# Patient Record
Sex: FEMALE | Race: WHITE | NOT HISPANIC OR LATINO | Employment: OTHER | URBAN - METROPOLITAN AREA
[De-identification: names, ages, dates, MRNs, and addresses within clinical notes are randomized per-mention and may not be internally consistent; named-entity substitution may affect disease eponyms.]

---

## 2022-03-04 ENCOUNTER — TELEPHONE (OUTPATIENT)
Dept: NEUROLOGY | Facility: CLINIC | Age: 67
End: 2022-03-04

## 2022-03-04 ENCOUNTER — OFFICE VISIT (OUTPATIENT)
Dept: NEUROLOGY | Facility: CLINIC | Age: 67
End: 2022-03-04

## 2022-03-04 VITALS — OXYGEN SATURATION: 95 % | HEIGHT: 67 IN | BODY MASS INDEX: 34.21 KG/M2 | WEIGHT: 218 LBS

## 2022-03-04 DIAGNOSIS — R42 DIZZINESS: ICD-10-CM

## 2022-03-04 DIAGNOSIS — R41.3 MEMORY LOSS: Primary | ICD-10-CM

## 2022-03-04 PROCEDURE — 99205 OFFICE O/P NEW HI 60 MIN: CPT | Performed by: PSYCHIATRY & NEUROLOGY

## 2022-03-04 RX ORDER — IRBESARTAN 150 MG/1
TABLET ORAL
Qty: 30 TABLET | Refills: 5 | Status: SHIPPED | OUTPATIENT
Start: 2022-03-04 | End: 2022-05-12 | Stop reason: ALTCHOICE

## 2022-03-04 RX ORDER — DESVENLAFAXINE 100 MG/1
TABLET, EXTENDED RELEASE ORAL
COMMUNITY
Start: 2021-12-29 | End: 2022-03-04 | Stop reason: SDUPTHER

## 2022-03-04 RX ORDER — IRBESARTAN 150 MG/1
TABLET ORAL
COMMUNITY
Start: 2021-12-29 | End: 2022-03-04 | Stop reason: SDUPTHER

## 2022-03-04 RX ORDER — LEVOCETIRIZINE DIHYDROCHLORIDE 5 MG/1
TABLET, FILM COATED ORAL
COMMUNITY
Start: 2021-12-10 | End: 2022-08-19

## 2022-03-04 RX ORDER — SIMVASTATIN 40 MG
40 TABLET ORAL NIGHTLY
Qty: 30 TABLET | Refills: 5 | Status: SHIPPED | OUTPATIENT
Start: 2022-03-04 | End: 2022-04-26 | Stop reason: HOSPADM

## 2022-03-04 RX ORDER — MECLIZINE HYDROCHLORIDE 25 MG/1
TABLET ORAL
COMMUNITY
Start: 2021-12-10 | End: 2022-08-19

## 2022-03-04 RX ORDER — LEVOTHYROXINE SODIUM 0.15 MG/1
TABLET ORAL
COMMUNITY
Start: 2021-12-29 | End: 2022-03-04

## 2022-03-04 RX ORDER — SIMVASTATIN 40 MG
TABLET ORAL
COMMUNITY
Start: 2021-12-09 | End: 2022-03-04 | Stop reason: SDUPTHER

## 2022-03-04 RX ORDER — DESVENLAFAXINE 100 MG/1
100 TABLET, EXTENDED RELEASE ORAL DAILY
Qty: 30 TABLET | Refills: 6 | Status: SHIPPED | OUTPATIENT
Start: 2022-03-04

## 2022-03-04 RX ORDER — LEVOTHYROXINE SODIUM 0.05 MG/1
TABLET ORAL
Qty: 60 TABLET | Refills: 2 | Status: SHIPPED | OUTPATIENT
Start: 2022-03-04 | End: 2022-05-12 | Stop reason: ALTCHOICE

## 2022-03-04 NOTE — PROGRESS NOTES
Subjective:    CC: Batsheva Barahona is seen today in consultation at the request of Jerrod Haywood MD for Dizziness, Memory Loss, and Numbness       HPI:  66-year-old female accompanied by her son with a history of postsurgical hypothyroidism, hyperlipidemia, hypertension, aortic valve replacement, depression presents with memory problems, dizziness.  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  After senior living she started to have worsening depression because of not having to do much .  Then about 6 to 8 months ago she stopped taking all of her medications as she did not think anything was helping including her thyroid, cholesterol, antidepressant and blood pressure meds.  Since then she has been extremely forgetful especially with her short-term memory.  She also gets agitated/irritable and sleeps a lot.  As per son she also has word finding difficulties and gets confused easily for example while ordering food from me drive-through patient takes a lot of time in deciding what she will eat.  She can still carry out her ADLs including stopped paying the bills now (son has taken over).  She has also stopped driving.  She had recently including a TSH level that was 47.8!!  B12 was 302, lipid panel was as follows-, , .  She is also complaining of numbness in her fingertips as well as lightheadedness/dizziness on walking.  In May 2019 she felt dizzy and then passed out in the bathroom.  She was taken to [facility] at the time and told that she had a vasovagal episode.  Denies any recent episodes with loss of consciousness.    The following portions of the patient's history were reviewed today and updated as of 03/04/2022  : allergies, current medications, past family history, past medical history, past social history, past surgical history and problem list  These document will be scanned to patient's chart.      Current  "Outpatient Medications:   •  desvenlafaxine (PRISTIQ) 100 MG 24 hr tablet, , Disp: , Rfl:   •  dexamethasone (DECADRON) 0.75 MG tablet, , Disp: , Rfl:   •  irbesartan (AVAPRO) 150 MG tablet, , Disp: , Rfl:   •  levocetirizine (XYZAL) 5 MG tablet, , Disp: , Rfl:   •  meclizine (ANTIVERT) 25 MG tablet, , Disp: , Rfl:   •  simvastatin (ZOCOR) 40 MG tablet, , Disp: , Rfl:   •  levothyroxine (Synthroid) 50 MCG tablet, Take 1 tablet daily for 2 weeks then 1-1/2 tablets daily for 2 weeks then 2 tablets daily, Disp: 60 tablet, Rfl: 2   Past Medical History:   Diagnosis Date   • Anxiety    • Bowel trouble    • CAD (coronary artery disease)    • CTS (carpal tunnel syndrome)    • Depression    • Difficulty walking    • Dizziness    • History of heart valve replacement    • HL (hearing loss)    • Hypertension    • Memory loss    • Migraine    • Numbness and tingling    • Thyroid disease    • Weakness       Past Surgical History:   Procedure Laterality Date   • AORTIC VALVE REPAIR/REPLACEMENT     • BONE SPUR ARM      Elbow   •  SECTION        Family History   Problem Relation Age of Onset   • Aneurysm Mother    • Cancer Mother    • Stroke Mother    • Migraines Sister    • Migraines Brother    • Cancer Maternal Grandmother    • Dementia Maternal Grandmother       Social History     Socioeconomic History   • Marital status:    Tobacco Use   • Smoking status: Former Smoker     Types: Cigarettes   • Smokeless tobacco: Never Used   • Tobacco comment: quit 2013   Vaping Use   • Vaping Use: Never used   Substance and Sexual Activity   • Alcohol use: Never   • Drug use: Never   • Sexual activity: Defer     Review of Systems   Neurological: Positive for dizziness, memory problem and confusion.   All other systems reviewed and are negative.      Objective:    Ht 168.9 cm (66.5\")   Wt 98.9 kg (218 lb)   SpO2 95%   BMI 34.66 kg/m²     Neurology Exam:    General apperance: NAD.  Orthostatics checked today were positive.  " Blood pressure on lying/sitting down was 190/110.  Blood pressure on standing up was 178/98    Mental status: Alert, awake and oriented to  place and person.  Could not tell me the month, year or her date of birth    Recent and Remote memory: Impaired.  MMSE of 17/20 with a delayed recall of 0/3    Attention span and Concentration: Normal.     Language and Speech: Intact- No dysarthria.    Fluency, Naming , Repitition and Comprehension:  Intact    Cranial Nerves:   CN II: Visual fields are full. Intact. Fundi - Normal, No papillederma, Pupils - JUDE  CN III, IV and VI: Extraocular movements are intact. Normal saccades.   CN V: Facial sensation is intact.   CN VII: Muscles of facial expression reveal no asymmetry. Intact.   CN VIII: Hearing is intact. Whispered voice intact.   CN IX and X: Palate elevates symmetrically. Intact  CN XI: Shoulder shrug is intact.   CN XII: Tongue is midline without evidence of atrophy or fasciculation.     Ophthalmoscopic exam of optic disc-normal    Motor:  Right UE muscle strength 5/5. Normal tone.     Left UE muscle strength 5/5. Normal tone.      Right LE muscle strength5/5. Normal tone.     Left LE muscle strength 5/5. Normal tone.      Sensory: Normal light touch, vibration and pinprick sensation bilaterally.    DTRs: 2+ bilaterally in upper and lower extremities.    Babinski: Negative bilaterally.    Co-ordination: Normal finger-to-nose, heel to shin B/L.    Rhomberg: Negative.    Gait: Antalgic gait due to right hip pain.  Using a cane    Cardiovascular: Regular rate and rhythm without murmur, gallop or rub.    Assessment and Plan:  1. Memory loss  I feel patient's cognitive problems are because of underlying depression as well as severe hypothyroidism (TSH of 47.8)  I will get her MRI brain to make sure she has not had any ischemic events as her blood pressure and cholesterol have both been extremely high  I have told her to gradually start her Synthroid till she reaches a  dose of 100 mcg daily.  At her next visit I will repeat a thyroid function test.  Previously she was on higher doses however she had dizziness  -I have also told her to start taking vitamin B12 supplements  -I will also restart her back on her simvastatin 40 mg daily, Pristiq 100 mg daily and irbesartan that she will increase gradually to 150 mg daily  -Counseling given to partake in hobbies and to carry out mental activities such as reading.     - MRI Brain Without Contrast; Future    2. Dizziness  Is most likely due to fluctuations in blood pressure with positive orthostatics today  I will start her back on her irbesartan  I have also told her to keep herself extremely well-hydrated.    If she continues to have the dizziness then I may start her on low doses of fludrocortisone       Return in about 2 months (around 5/4/2022).     I spent over 55 minutes with the patient face to face out of which over 50% (45 minutes) was spent in management including obtaining MMSE, instructions and education.     Siomara Hyde MD

## 2022-03-04 NOTE — TELEPHONE ENCOUNTER
Yes she can.  There is no harm in taking it however we do not know the efficacy as it has not been studied

## 2022-04-18 RX ORDER — FLUDROCORTISONE ACETATE 0.1 MG/1
0.1 TABLET ORAL DAILY
Qty: 30 TABLET | Refills: 3 | Status: SHIPPED | OUTPATIENT
Start: 2022-04-18 | End: 2022-04-26 | Stop reason: HOSPADM

## 2022-04-22 ENCOUNTER — TELEPHONE (OUTPATIENT)
Dept: NEUROLOGY | Facility: CLINIC | Age: 67
End: 2022-04-22

## 2022-04-22 ENCOUNTER — HOSPITAL ENCOUNTER (OUTPATIENT)
Dept: MRI IMAGING | Facility: HOSPITAL | Age: 67
Discharge: HOME | End: 2022-04-22
Admitting: PSYCHIATRY & NEUROLOGY

## 2022-04-22 ENCOUNTER — APPOINTMENT (OUTPATIENT)
Dept: GENERAL RADIOLOGY | Facility: HOSPITAL | Age: 67
End: 2022-04-22

## 2022-04-22 ENCOUNTER — APPOINTMENT (OUTPATIENT)
Dept: CT IMAGING | Facility: HOSPITAL | Age: 67
End: 2022-04-22

## 2022-04-22 ENCOUNTER — HOSPITAL ENCOUNTER (INPATIENT)
Facility: HOSPITAL | Age: 67
LOS: 4 days | Discharge: NURSING, REHAB OR LONG-TERM CARE | End: 2022-04-26
Attending: EMERGENCY MEDICINE | Admitting: INTERNAL MEDICINE

## 2022-04-22 DIAGNOSIS — R47.1 DYSARTHRIA: ICD-10-CM

## 2022-04-22 DIAGNOSIS — R41.0 CONFUSION: ICD-10-CM

## 2022-04-22 DIAGNOSIS — R41.3 MEMORY LOSS: ICD-10-CM

## 2022-04-22 DIAGNOSIS — I63.9 ACUTE STROKE DUE TO ISCHEMIA: Primary | ICD-10-CM

## 2022-04-22 PROBLEM — G43.909 MIGRAINE HEADACHE: Status: ACTIVE | Noted: 2022-04-22

## 2022-04-22 PROBLEM — R42 DIZZINESS: Status: ACTIVE | Noted: 2022-04-22

## 2022-04-22 PROBLEM — E89.0 POST-SURGICAL HYPOTHYROIDISM: Status: ACTIVE | Noted: 2022-04-22

## 2022-04-22 PROBLEM — I10 PRIMARY HYPERTENSION: Status: ACTIVE | Noted: 2022-04-22

## 2022-04-22 PROBLEM — I25.10 CORONARY ARTERY DISEASE INVOLVING NATIVE CORONARY ARTERY: Status: ACTIVE | Noted: 2022-04-22

## 2022-04-22 PROBLEM — E78.2 MIXED HYPERLIPIDEMIA: Status: ACTIVE | Noted: 2022-04-22

## 2022-04-22 PROBLEM — R55 SYNCOPE: Status: ACTIVE | Noted: 2022-04-22

## 2022-04-22 LAB
ALBUMIN SERPL-MCNC: 4.4 G/DL (ref 3.5–5.2)
ALBUMIN/GLOB SERPL: 1.4 G/DL
ALP SERPL-CCNC: 107 U/L (ref 39–117)
ALT SERPL W P-5'-P-CCNC: 13 U/L (ref 1–33)
ANION GAP SERPL CALCULATED.3IONS-SCNC: 10 MMOL/L (ref 5–15)
AST SERPL-CCNC: 17 U/L (ref 1–32)
BASOPHILS # BLD AUTO: 0.03 10*3/MM3 (ref 0–0.2)
BASOPHILS NFR BLD AUTO: 0.3 % (ref 0–1.5)
BILIRUB SERPL-MCNC: 0.5 MG/DL (ref 0–1.2)
BILIRUB UR QL STRIP: NEGATIVE
BUN SERPL-MCNC: 18 MG/DL (ref 8–23)
BUN/CREAT SERPL: 19.4 (ref 7–25)
CALCIUM SPEC-SCNC: 9.8 MG/DL (ref 8.6–10.5)
CHLORIDE SERPL-SCNC: 103 MMOL/L (ref 98–107)
CLARITY UR: CLEAR
CO2 SERPL-SCNC: 28 MMOL/L (ref 22–29)
COLOR UR: YELLOW
CREAT SERPL-MCNC: 0.93 MG/DL (ref 0.57–1)
D-LACTATE SERPL-SCNC: 1.7 MMOL/L (ref 0.5–2)
DEPRECATED RDW RBC AUTO: 46.6 FL (ref 37–54)
EGFRCR SERPLBLD CKD-EPI 2021: 67.9 ML/MIN/1.73
EOSINOPHIL # BLD AUTO: 0.2 10*3/MM3 (ref 0–0.4)
EOSINOPHIL NFR BLD AUTO: 1.7 % (ref 0.3–6.2)
ERYTHROCYTE [DISTWIDTH] IN BLOOD BY AUTOMATED COUNT: 13 % (ref 12.3–15.4)
FLUAV RNA RESP QL NAA+PROBE: NOT DETECTED
FLUBV RNA RESP QL NAA+PROBE: NOT DETECTED
GLOBULIN UR ELPH-MCNC: 3.1 GM/DL
GLUCOSE SERPL-MCNC: 117 MG/DL (ref 65–99)
GLUCOSE UR STRIP-MCNC: NEGATIVE MG/DL
HCT VFR BLD AUTO: 41.8 % (ref 34–46.6)
HGB BLD-MCNC: 13.9 G/DL (ref 12–15.9)
HGB UR QL STRIP.AUTO: NEGATIVE
HOLD SPECIMEN: NORMAL
IMM GRANULOCYTES # BLD AUTO: 0.1 10*3/MM3 (ref 0–0.05)
IMM GRANULOCYTES NFR BLD AUTO: 0.9 % (ref 0–0.5)
KETONES UR QL STRIP: NEGATIVE
LEUKOCYTE ESTERASE UR QL STRIP.AUTO: NEGATIVE
LYMPHOCYTES # BLD AUTO: 2.46 10*3/MM3 (ref 0.7–3.1)
LYMPHOCYTES NFR BLD AUTO: 21.1 % (ref 19.6–45.3)
MCH RBC QN AUTO: 32.2 PG (ref 26.6–33)
MCHC RBC AUTO-ENTMCNC: 33.3 G/DL (ref 31.5–35.7)
MCV RBC AUTO: 96.8 FL (ref 79–97)
MONOCYTES # BLD AUTO: 0.95 10*3/MM3 (ref 0.1–0.9)
MONOCYTES NFR BLD AUTO: 8.2 % (ref 5–12)
NEUTROPHILS NFR BLD AUTO: 67.8 % (ref 42.7–76)
NEUTROPHILS NFR BLD AUTO: 7.9 10*3/MM3 (ref 1.7–7)
NITRITE UR QL STRIP: NEGATIVE
NRBC BLD AUTO-RTO: 0 /100 WBC (ref 0–0.2)
PH UR STRIP.AUTO: 6.5 [PH] (ref 5–8)
PLATELET # BLD AUTO: 246 10*3/MM3 (ref 140–450)
PMV BLD AUTO: 11.3 FL (ref 6–12)
POTASSIUM SERPL-SCNC: 4 MMOL/L (ref 3.5–5.2)
PROCALCITONIN SERPL-MCNC: 0.04 NG/ML (ref 0–0.25)
PROT SERPL-MCNC: 7.5 G/DL (ref 6–8.5)
PROT UR QL STRIP: NEGATIVE
RBC # BLD AUTO: 4.32 10*6/MM3 (ref 3.77–5.28)
SARS-COV-2 RNA RESP QL NAA+PROBE: NOT DETECTED
SODIUM SERPL-SCNC: 141 MMOL/L (ref 136–145)
SP GR UR STRIP: 1.01 (ref 1–1.03)
T4 FREE SERPL-MCNC: 2 NG/DL (ref 0.93–1.7)
TROPONIN T SERPL-MCNC: <0.01 NG/ML (ref 0–0.03)
TSH SERPL DL<=0.05 MIU/L-ACNC: 1.15 UIU/ML (ref 0.27–4.2)
UROBILINOGEN UR QL STRIP: NORMAL
WBC NRBC COR # BLD: 11.64 10*3/MM3 (ref 3.4–10.8)
WHOLE BLOOD HOLD SPECIMEN: NORMAL
WHOLE BLOOD HOLD SPECIMEN: NORMAL

## 2022-04-22 PROCEDURE — 84484 ASSAY OF TROPONIN QUANT: CPT | Performed by: EMERGENCY MEDICINE

## 2022-04-22 PROCEDURE — 85025 COMPLETE CBC W/AUTO DIFF WBC: CPT | Performed by: EMERGENCY MEDICINE

## 2022-04-22 PROCEDURE — 70498 CT ANGIOGRAPHY NECK: CPT

## 2022-04-22 PROCEDURE — 0 IOPAMIDOL PER 1 ML: Performed by: EMERGENCY MEDICINE

## 2022-04-22 PROCEDURE — 83605 ASSAY OF LACTIC ACID: CPT | Performed by: EMERGENCY MEDICINE

## 2022-04-22 PROCEDURE — 84443 ASSAY THYROID STIM HORMONE: CPT

## 2022-04-22 PROCEDURE — 99223 1ST HOSP IP/OBS HIGH 75: CPT

## 2022-04-22 PROCEDURE — 84439 ASSAY OF FREE THYROXINE: CPT

## 2022-04-22 PROCEDURE — 87636 SARSCOV2 & INF A&B AMP PRB: CPT | Performed by: EMERGENCY MEDICINE

## 2022-04-22 PROCEDURE — 71045 X-RAY EXAM CHEST 1 VIEW: CPT

## 2022-04-22 PROCEDURE — 4A03X5D MEASUREMENT OF ARTERIAL FLOW, INTRACRANIAL, EXTERNAL APPROACH: ICD-10-PCS | Performed by: RADIOLOGY

## 2022-04-22 PROCEDURE — P9612 CATHETERIZE FOR URINE SPEC: HCPCS

## 2022-04-22 PROCEDURE — 70496 CT ANGIOGRAPHY HEAD: CPT

## 2022-04-22 PROCEDURE — 81003 URINALYSIS AUTO W/O SCOPE: CPT | Performed by: EMERGENCY MEDICINE

## 2022-04-22 PROCEDURE — 80053 COMPREHEN METABOLIC PANEL: CPT | Performed by: EMERGENCY MEDICINE

## 2022-04-22 PROCEDURE — 99285 EMERGENCY DEPT VISIT HI MDM: CPT

## 2022-04-22 PROCEDURE — 70551 MRI BRAIN STEM W/O DYE: CPT

## 2022-04-22 PROCEDURE — 99222 1ST HOSP IP/OBS MODERATE 55: CPT | Performed by: HOSPITALIST

## 2022-04-22 PROCEDURE — 84145 PROCALCITONIN (PCT): CPT | Performed by: NURSE PRACTITIONER

## 2022-04-22 PROCEDURE — 84481 FREE ASSAY (FT-3): CPT

## 2022-04-22 PROCEDURE — 93005 ELECTROCARDIOGRAM TRACING: CPT | Performed by: EMERGENCY MEDICINE

## 2022-04-22 RX ORDER — DESVENLAFAXINE SUCCINATE 50 MG/1
100 TABLET, EXTENDED RELEASE ORAL DAILY
Status: DISCONTINUED | OUTPATIENT
Start: 2022-04-22 | End: 2022-04-26 | Stop reason: HOSPADM

## 2022-04-22 RX ORDER — SODIUM CHLORIDE 0.9 % (FLUSH) 0.9 %
10 SYRINGE (ML) INJECTION AS NEEDED
Status: DISCONTINUED | OUTPATIENT
Start: 2022-04-22 | End: 2022-04-26 | Stop reason: HOSPADM

## 2022-04-22 RX ORDER — ASPIRIN 81 MG/1
81 TABLET, CHEWABLE ORAL DAILY
Status: DISCONTINUED | OUTPATIENT
Start: 2022-04-22 | End: 2022-04-26 | Stop reason: HOSPADM

## 2022-04-22 RX ORDER — LORAZEPAM 2 MG/ML
0.25 INJECTION INTRAMUSCULAR ONCE
Status: COMPLETED | OUTPATIENT
Start: 2022-04-22 | End: 2022-04-24

## 2022-04-22 RX ORDER — ASPIRIN 300 MG/1
300 SUPPOSITORY RECTAL DAILY
Status: DISCONTINUED | OUTPATIENT
Start: 2022-04-22 | End: 2022-04-26 | Stop reason: HOSPADM

## 2022-04-22 RX ORDER — FLUDROCORTISONE ACETATE 0.1 MG/1
0.1 TABLET ORAL DAILY
Status: DISCONTINUED | OUTPATIENT
Start: 2022-04-22 | End: 2022-04-24

## 2022-04-22 RX ORDER — CLOPIDOGREL BISULFATE 75 MG/1
75 TABLET ORAL DAILY
Status: DISCONTINUED | OUTPATIENT
Start: 2022-04-23 | End: 2022-04-26 | Stop reason: HOSPADM

## 2022-04-22 RX ORDER — ACETAMINOPHEN 325 MG/1
650 TABLET ORAL EVERY 4 HOURS PRN
Status: DISCONTINUED | OUTPATIENT
Start: 2022-04-22 | End: 2022-04-26 | Stop reason: HOSPADM

## 2022-04-22 RX ORDER — CLOPIDOGREL BISULFATE 75 MG/1
300 TABLET ORAL ONCE
Status: COMPLETED | OUTPATIENT
Start: 2022-04-22 | End: 2022-04-22

## 2022-04-22 RX ORDER — LEVOTHYROXINE SODIUM 0.1 MG/1
100 TABLET ORAL
Status: DISCONTINUED | OUTPATIENT
Start: 2022-04-23 | End: 2022-04-26 | Stop reason: HOSPADM

## 2022-04-22 RX ORDER — ATORVASTATIN CALCIUM 40 MG/1
80 TABLET, FILM COATED ORAL NIGHTLY
Status: DISCONTINUED | OUTPATIENT
Start: 2022-04-22 | End: 2022-04-26 | Stop reason: HOSPADM

## 2022-04-22 RX ORDER — SODIUM CHLORIDE 0.9 % (FLUSH) 0.9 %
10 SYRINGE (ML) INJECTION EVERY 12 HOURS SCHEDULED
Status: DISCONTINUED | OUTPATIENT
Start: 2022-04-22 | End: 2022-04-26 | Stop reason: HOSPADM

## 2022-04-22 RX ORDER — ACETAMINOPHEN 650 MG/1
650 SUPPOSITORY RECTAL EVERY 4 HOURS PRN
Status: DISCONTINUED | OUTPATIENT
Start: 2022-04-22 | End: 2022-04-26 | Stop reason: HOSPADM

## 2022-04-22 RX ORDER — ACETAMINOPHEN 160 MG/5ML
650 SOLUTION ORAL EVERY 4 HOURS PRN
Status: DISCONTINUED | OUTPATIENT
Start: 2022-04-22 | End: 2022-04-26 | Stop reason: HOSPADM

## 2022-04-22 RX ADMIN — ASPIRIN 81 MG 81 MG: 81 TABLET ORAL at 17:57

## 2022-04-22 RX ADMIN — Medication 10 ML: at 23:33

## 2022-04-22 RX ADMIN — ATORVASTATIN CALCIUM 80 MG: 40 TABLET, FILM COATED ORAL at 23:33

## 2022-04-22 RX ADMIN — IOPAMIDOL 100 ML: 755 INJECTION, SOLUTION INTRAVENOUS at 17:00

## 2022-04-22 RX ADMIN — SODIUM CHLORIDE 1000 ML: 9 INJECTION, SOLUTION INTRAVENOUS at 15:26

## 2022-04-22 RX ADMIN — CLOPIDOGREL BISULFATE 300 MG: 75 TABLET ORAL at 17:57

## 2022-04-22 NOTE — TELEPHONE ENCOUNTER
I was notified by the radiologist that patient's MRI brain was completed and it did show an acute infarct in the right centrum semiovale.  I personally reviewed the images and it also shows a subacute infarct in the right cerebellar region.  I spoke to the patient's son over the phone.  Patient has been extremely confused for the past few days and also sustained a fall in the bathtub on Sunday.  Denies having any weakness.  I have told him to take the patient to [facility] ER where she should have a full stroke work-up.  They voiced understanding and would be going to the ER.

## 2022-04-23 ENCOUNTER — APPOINTMENT (OUTPATIENT)
Dept: CARDIOLOGY | Facility: HOSPITAL | Age: 67
End: 2022-04-23

## 2022-04-23 LAB
ANION GAP SERPL CALCULATED.3IONS-SCNC: 10 MMOL/L (ref 5–15)
BASOPHILS # BLD AUTO: 0.05 10*3/MM3 (ref 0–0.2)
BASOPHILS NFR BLD AUTO: 0.4 % (ref 0–1.5)
BH CV ECHO MEAS - AO MAX PG: 25.5 MMHG
BH CV ECHO MEAS - AO MEAN PG: 13.9 MMHG
BH CV ECHO MEAS - AO ROOT DIAM: 2.5 CM
BH CV ECHO MEAS - AO V2 MAX: 252.5 CM/SEC
BH CV ECHO MEAS - AO V2 VTI: 51.7 CM
BH CV ECHO MEAS - AVA(I,D): 1.42 CM2
BH CV ECHO MEAS - EDV(CUBED): 62.8 ML
BH CV ECHO MEAS - EDV(MOD-SP2): 34 ML
BH CV ECHO MEAS - EDV(MOD-SP4): 54 ML
BH CV ECHO MEAS - EF(MOD-BP): 58 %
BH CV ECHO MEAS - EF(MOD-SP2): 52.9 %
BH CV ECHO MEAS - EF(MOD-SP4): 63 %
BH CV ECHO MEAS - ESV(CUBED): 16.2 ML
BH CV ECHO MEAS - ESV(MOD-SP2): 16 ML
BH CV ECHO MEAS - ESV(MOD-SP4): 20 ML
BH CV ECHO MEAS - FS: 36.3 %
BH CV ECHO MEAS - IVS/LVPW: 1 CM
BH CV ECHO MEAS - IVSD: 1.17 CM
BH CV ECHO MEAS - LA DIMENSION: 3.5 CM
BH CV ECHO MEAS - LAT PEAK E' VEL: 4.3 CM/SEC
BH CV ECHO MEAS - LV MASS(C)D: 158.5 GRAMS
BH CV ECHO MEAS - LV MAX PG: 6 MMHG
BH CV ECHO MEAS - LV MEAN PG: 3.3 MMHG
BH CV ECHO MEAS - LV V1 MAX: 122.4 CM/SEC
BH CV ECHO MEAS - LV V1 VTI: 25.2 CM
BH CV ECHO MEAS - LVIDD: 4 CM
BH CV ECHO MEAS - LVIDS: 2.5 CM
BH CV ECHO MEAS - LVOT AREA: 2.9 CM2
BH CV ECHO MEAS - LVOT DIAM: 1.93 CM
BH CV ECHO MEAS - LVPWD: 1.17 CM
BH CV ECHO MEAS - MED PEAK E' VEL: 4.7 CM/SEC
BH CV ECHO MEAS - MV A MAX VEL: 78.5 CM/SEC
BH CV ECHO MEAS - MV DEC SLOPE: 268 CM/SEC2
BH CV ECHO MEAS - MV DEC TIME: 0.39 MSEC
BH CV ECHO MEAS - MV E MAX VEL: 45.9 CM/SEC
BH CV ECHO MEAS - MV E/A: 0.58
BH CV ECHO MEAS - PA ACC SLOPE: 626.8 CM/SEC2
BH CV ECHO MEAS - PA ACC TIME: 0.14 SEC
BH CV ECHO MEAS - PA PR(ACCEL): 17.2 MMHG
BH CV ECHO MEAS - RAP SYSTOLE: 8 MMHG
BH CV ECHO MEAS - RVSP: 28 MMHG
BH CV ECHO MEAS - SV(LVOT): 73.6 ML
BH CV ECHO MEAS - SV(MOD-SP2): 18 ML
BH CV ECHO MEAS - SV(MOD-SP4): 34 ML
BH CV ECHO MEAS - TAPSE (>1.6): 1.7 CM
BH CV ECHO MEAS - TR MAX PG: 20.1 MMHG
BH CV ECHO MEAS - TR MAX VEL: 224 CM/SEC
BH CV ECHO MEASUREMENTS AVERAGE E/E' RATIO: 10.2
BH CV VAS BP RIGHT ARM: NORMAL MMHG
BH CV XLRA - RV BASE: 2.8 CM
BH CV XLRA - RV LENGTH: 6 CM
BH CV XLRA - RV MID: 2.2 CM
BH CV XLRA - TDI S': 10.7 CM/SEC
BUN SERPL-MCNC: 21 MG/DL (ref 8–23)
BUN/CREAT SERPL: 24.1 (ref 7–25)
CALCIUM SPEC-SCNC: 9.3 MG/DL (ref 8.6–10.5)
CHLORIDE SERPL-SCNC: 104 MMOL/L (ref 98–107)
CHOLEST SERPL-MCNC: 136 MG/DL (ref 0–200)
CO2 SERPL-SCNC: 24 MMOL/L (ref 22–29)
CREAT SERPL-MCNC: 0.87 MG/DL (ref 0.57–1)
DEPRECATED RDW RBC AUTO: 44.8 FL (ref 37–54)
EGFRCR SERPLBLD CKD-EPI 2021: 73.6 ML/MIN/1.73
EOSINOPHIL # BLD AUTO: 0.3 10*3/MM3 (ref 0–0.4)
EOSINOPHIL NFR BLD AUTO: 2.6 % (ref 0.3–6.2)
ERYTHROCYTE [DISTWIDTH] IN BLOOD BY AUTOMATED COUNT: 13.2 % (ref 12.3–15.4)
GLUCOSE BLDC GLUCOMTR-MCNC: 114 MG/DL (ref 70–130)
GLUCOSE BLDC GLUCOMTR-MCNC: 152 MG/DL (ref 70–130)
GLUCOSE BLDC GLUCOMTR-MCNC: 182 MG/DL (ref 70–130)
GLUCOSE SERPL-MCNC: 106 MG/DL (ref 65–99)
HBA1C MFR BLD: 5.8 % (ref 4.8–5.6)
HCT VFR BLD AUTO: 39.4 % (ref 34–46.6)
HDLC SERPL-MCNC: 40 MG/DL (ref 40–60)
HGB BLD-MCNC: 13.2 G/DL (ref 12–15.9)
IMM GRANULOCYTES # BLD AUTO: 0.11 10*3/MM3 (ref 0–0.05)
IMM GRANULOCYTES NFR BLD AUTO: 1 % (ref 0–0.5)
IVRT: 85 MSEC
LDLC SERPL CALC-MCNC: 77 MG/DL (ref 0–100)
LDLC/HDLC SERPL: 1.88 {RATIO}
LEFT ATRIUM VOLUME INDEX: 22.4 ML/M2
LYMPHOCYTES # BLD AUTO: 3.66 10*3/MM3 (ref 0.7–3.1)
LYMPHOCYTES NFR BLD AUTO: 31.7 % (ref 19.6–45.3)
MAXIMAL PREDICTED HEART RATE: 154 BPM
MCH RBC QN AUTO: 30.9 PG (ref 26.6–33)
MCHC RBC AUTO-ENTMCNC: 33.5 G/DL (ref 31.5–35.7)
MCV RBC AUTO: 92.3 FL (ref 79–97)
MONOCYTES # BLD AUTO: 1.01 10*3/MM3 (ref 0.1–0.9)
MONOCYTES NFR BLD AUTO: 8.7 % (ref 5–12)
NEUTROPHILS NFR BLD AUTO: 55.6 % (ref 42.7–76)
NEUTROPHILS NFR BLD AUTO: 6.42 10*3/MM3 (ref 1.7–7)
NRBC BLD AUTO-RTO: 0 /100 WBC (ref 0–0.2)
PLATELET # BLD AUTO: 235 10*3/MM3 (ref 140–450)
PMV BLD AUTO: 11.6 FL (ref 6–12)
POTASSIUM SERPL-SCNC: 4.1 MMOL/L (ref 3.5–5.2)
QT INTERVAL: 468 MS
QTC INTERVAL: 468 MS
RBC # BLD AUTO: 4.27 10*6/MM3 (ref 3.77–5.28)
SODIUM SERPL-SCNC: 138 MMOL/L (ref 136–145)
STRESS TARGET HR: 131 BPM
T3FREE SERPL-MCNC: 2.63 PG/ML (ref 2–4.4)
TRIGL SERPL-MCNC: 105 MG/DL (ref 0–150)
VLDLC SERPL-MCNC: 19 MG/DL (ref 5–40)
WBC NRBC COR # BLD: 11.55 10*3/MM3 (ref 3.4–10.8)

## 2022-04-23 PROCEDURE — 99233 SBSQ HOSP IP/OBS HIGH 50: CPT | Performed by: PSYCHIATRY & NEUROLOGY

## 2022-04-23 PROCEDURE — 85025 COMPLETE CBC W/AUTO DIFF WBC: CPT | Performed by: NURSE PRACTITIONER

## 2022-04-23 PROCEDURE — 82962 GLUCOSE BLOOD TEST: CPT

## 2022-04-23 PROCEDURE — 80048 BASIC METABOLIC PNL TOTAL CA: CPT | Performed by: NURSE PRACTITIONER

## 2022-04-23 PROCEDURE — 97162 PT EVAL MOD COMPLEX 30 MIN: CPT

## 2022-04-23 PROCEDURE — 80061 LIPID PANEL: CPT

## 2022-04-23 PROCEDURE — 93306 TTE W/DOPPLER COMPLETE: CPT | Performed by: INTERNAL MEDICINE

## 2022-04-23 PROCEDURE — 99232 SBSQ HOSP IP/OBS MODERATE 35: CPT | Performed by: INTERNAL MEDICINE

## 2022-04-23 PROCEDURE — 93306 TTE W/DOPPLER COMPLETE: CPT

## 2022-04-23 PROCEDURE — 83036 HEMOGLOBIN GLYCOSYLATED A1C: CPT

## 2022-04-23 PROCEDURE — 97110 THERAPEUTIC EXERCISES: CPT

## 2022-04-23 PROCEDURE — 25010000002 HYDRALAZINE PER 20 MG: Performed by: PHYSICIAN ASSISTANT

## 2022-04-23 RX ORDER — LOSARTAN POTASSIUM 25 MG/1
25 TABLET ORAL
Status: DISCONTINUED | OUTPATIENT
Start: 2022-04-24 | End: 2022-04-26 | Stop reason: HOSPADM

## 2022-04-23 RX ORDER — HYDRALAZINE HYDROCHLORIDE 20 MG/ML
10 INJECTION INTRAMUSCULAR; INTRAVENOUS EVERY 6 HOURS PRN
Status: DISCONTINUED | OUTPATIENT
Start: 2022-04-23 | End: 2022-04-26 | Stop reason: HOSPADM

## 2022-04-23 RX ADMIN — CLOPIDOGREL BISULFATE 75 MG: 75 TABLET ORAL at 09:43

## 2022-04-23 RX ADMIN — FLUDROCORTISONE ACETATE 0.1 MG: 0.1 TABLET ORAL at 09:43

## 2022-04-23 RX ADMIN — ATORVASTATIN CALCIUM 80 MG: 40 TABLET, FILM COATED ORAL at 20:26

## 2022-04-23 RX ADMIN — HYDRALAZINE HYDROCHLORIDE 10 MG: 20 INJECTION INTRAMUSCULAR; INTRAVENOUS at 05:51

## 2022-04-23 RX ADMIN — LEVOTHYROXINE SODIUM 100 MCG: 100 TABLET ORAL at 05:47

## 2022-04-23 RX ADMIN — Medication 10 ML: at 20:27

## 2022-04-23 RX ADMIN — DESVENLAFAXINE SUCCINATE 100 MG: 50 TABLET, EXTENDED RELEASE ORAL at 09:43

## 2022-04-23 RX ADMIN — Medication 10 ML: at 09:44

## 2022-04-23 RX ADMIN — ASPIRIN 81 MG 81 MG: 81 TABLET ORAL at 09:43

## 2022-04-24 ENCOUNTER — APPOINTMENT (OUTPATIENT)
Dept: MRI IMAGING | Facility: HOSPITAL | Age: 67
End: 2022-04-24

## 2022-04-24 LAB
ALBUMIN SERPL-MCNC: 4 G/DL (ref 3.5–5.2)
ANION GAP SERPL CALCULATED.3IONS-SCNC: 10 MMOL/L (ref 5–15)
BASOPHILS # BLD AUTO: 0.05 10*3/MM3 (ref 0–0.2)
BASOPHILS NFR BLD AUTO: 0.5 % (ref 0–1.5)
BUN SERPL-MCNC: 21 MG/DL (ref 8–23)
BUN/CREAT SERPL: 23.9 (ref 7–25)
CALCIUM SPEC-SCNC: 9.7 MG/DL (ref 8.6–10.5)
CHLORIDE SERPL-SCNC: 102 MMOL/L (ref 98–107)
CO2 SERPL-SCNC: 25 MMOL/L (ref 22–29)
CREAT SERPL-MCNC: 0.88 MG/DL (ref 0.57–1)
DEPRECATED RDW RBC AUTO: 46.9 FL (ref 37–54)
EGFRCR SERPLBLD CKD-EPI 2021: 72.6 ML/MIN/1.73
EOSINOPHIL # BLD AUTO: 0.41 10*3/MM3 (ref 0–0.4)
EOSINOPHIL NFR BLD AUTO: 3.7 % (ref 0.3–6.2)
ERYTHROCYTE [DISTWIDTH] IN BLOOD BY AUTOMATED COUNT: 13.2 % (ref 12.3–15.4)
GLUCOSE SERPL-MCNC: 123 MG/DL (ref 65–99)
HCT VFR BLD AUTO: 39.8 % (ref 34–46.6)
HGB BLD-MCNC: 12.9 G/DL (ref 12–15.9)
IMM GRANULOCYTES # BLD AUTO: 0.1 10*3/MM3 (ref 0–0.05)
IMM GRANULOCYTES NFR BLD AUTO: 0.9 % (ref 0–0.5)
LYMPHOCYTES # BLD AUTO: 3.03 10*3/MM3 (ref 0.7–3.1)
LYMPHOCYTES NFR BLD AUTO: 27.4 % (ref 19.6–45.3)
MCH RBC QN AUTO: 31.1 PG (ref 26.6–33)
MCHC RBC AUTO-ENTMCNC: 32.4 G/DL (ref 31.5–35.7)
MCV RBC AUTO: 95.9 FL (ref 79–97)
MONOCYTES # BLD AUTO: 0.83 10*3/MM3 (ref 0.1–0.9)
MONOCYTES NFR BLD AUTO: 7.5 % (ref 5–12)
NEUTROPHILS NFR BLD AUTO: 6.64 10*3/MM3 (ref 1.7–7)
NEUTROPHILS NFR BLD AUTO: 60 % (ref 42.7–76)
NRBC BLD AUTO-RTO: 0 /100 WBC (ref 0–0.2)
PHOSPHATE SERPL-MCNC: 3.6 MG/DL (ref 2.5–4.5)
PLATELET # BLD AUTO: 244 10*3/MM3 (ref 140–450)
PMV BLD AUTO: 11.8 FL (ref 6–12)
POTASSIUM SERPL-SCNC: 4.4 MMOL/L (ref 3.5–5.2)
RBC # BLD AUTO: 4.15 10*6/MM3 (ref 3.77–5.28)
SODIUM SERPL-SCNC: 137 MMOL/L (ref 136–145)
WBC NRBC COR # BLD: 11.06 10*3/MM3 (ref 3.4–10.8)

## 2022-04-24 PROCEDURE — 99232 SBSQ HOSP IP/OBS MODERATE 35: CPT | Performed by: INTERNAL MEDICINE

## 2022-04-24 PROCEDURE — 97166 OT EVAL MOD COMPLEX 45 MIN: CPT

## 2022-04-24 PROCEDURE — 85025 COMPLETE CBC W/AUTO DIFF WBC: CPT | Performed by: INTERNAL MEDICINE

## 2022-04-24 PROCEDURE — 0 GADOBENATE DIMEGLUMINE 529 MG/ML SOLUTION: Performed by: INTERNAL MEDICINE

## 2022-04-24 PROCEDURE — A9577 INJ MULTIHANCE: HCPCS | Performed by: INTERNAL MEDICINE

## 2022-04-24 PROCEDURE — 99232 SBSQ HOSP IP/OBS MODERATE 35: CPT | Performed by: PSYCHIATRY & NEUROLOGY

## 2022-04-24 PROCEDURE — 70543 MRI ORBT/FAC/NCK W/O &W/DYE: CPT

## 2022-04-24 PROCEDURE — 25010000002 LORAZEPAM PER 2 MG: Performed by: INTERNAL MEDICINE

## 2022-04-24 PROCEDURE — 80069 RENAL FUNCTION PANEL: CPT | Performed by: INTERNAL MEDICINE

## 2022-04-24 PROCEDURE — 92523 SPEECH SOUND LANG COMPREHEN: CPT

## 2022-04-24 RX ORDER — AMOXICILLIN 250 MG
2 CAPSULE ORAL 2 TIMES DAILY
Status: DISCONTINUED | OUTPATIENT
Start: 2022-04-24 | End: 2022-04-26 | Stop reason: HOSPADM

## 2022-04-24 RX ADMIN — Medication 10 ML: at 22:24

## 2022-04-24 RX ADMIN — ATORVASTATIN CALCIUM 80 MG: 40 TABLET, FILM COATED ORAL at 22:23

## 2022-04-24 RX ADMIN — DESVENLAFAXINE SUCCINATE 100 MG: 50 TABLET, EXTENDED RELEASE ORAL at 08:09

## 2022-04-24 RX ADMIN — LOSARTAN POTASSIUM 25 MG: 25 TABLET, FILM COATED ORAL at 08:09

## 2022-04-24 RX ADMIN — ASPIRIN 81 MG 81 MG: 81 TABLET ORAL at 08:09

## 2022-04-24 RX ADMIN — Medication 10 ML: at 08:10

## 2022-04-24 RX ADMIN — FLUDROCORTISONE ACETATE 0.1 MG: 0.1 TABLET ORAL at 08:09

## 2022-04-24 RX ADMIN — CLOPIDOGREL BISULFATE 75 MG: 75 TABLET ORAL at 08:09

## 2022-04-24 RX ADMIN — LORAZEPAM 0.25 MG: 2 INJECTION INTRAMUSCULAR; INTRAVENOUS at 02:19

## 2022-04-24 RX ADMIN — LEVOTHYROXINE SODIUM 100 MCG: 100 TABLET ORAL at 06:10

## 2022-04-24 RX ADMIN — GADOBENATE DIMEGLUMINE 18 ML: 529 INJECTION, SOLUTION INTRAVENOUS at 04:37

## 2022-04-24 RX ADMIN — DOCUSATE SODIUM 50 MG AND SENNOSIDES 8.6 MG 2 TABLET: 8.6; 5 TABLET, FILM COATED ORAL at 12:10

## 2022-04-24 RX ADMIN — DOCUSATE SODIUM 50 MG AND SENNOSIDES 8.6 MG 2 TABLET: 8.6; 5 TABLET, FILM COATED ORAL at 22:23

## 2022-04-25 LAB
ALBUMIN SERPL-MCNC: 3.6 G/DL (ref 3.5–5.2)
ANION GAP SERPL CALCULATED.3IONS-SCNC: 10 MMOL/L (ref 5–15)
BASOPHILS # BLD AUTO: 0.02 10*3/MM3 (ref 0–0.2)
BASOPHILS NFR BLD AUTO: 0.2 % (ref 0–1.5)
BUN SERPL-MCNC: 16 MG/DL (ref 8–23)
BUN/CREAT SERPL: 19.3 (ref 7–25)
CALCIUM SPEC-SCNC: 9.5 MG/DL (ref 8.6–10.5)
CHLORIDE SERPL-SCNC: 103 MMOL/L (ref 98–107)
CO2 SERPL-SCNC: 25 MMOL/L (ref 22–29)
CREAT SERPL-MCNC: 0.83 MG/DL (ref 0.57–1)
DEPRECATED RDW RBC AUTO: 45.1 FL (ref 37–54)
EGFRCR SERPLBLD CKD-EPI 2021: 77.9 ML/MIN/1.73
EOSINOPHIL # BLD AUTO: 0.49 10*3/MM3 (ref 0–0.4)
EOSINOPHIL NFR BLD AUTO: 4.4 % (ref 0.3–6.2)
ERYTHROCYTE [DISTWIDTH] IN BLOOD BY AUTOMATED COUNT: 13.3 % (ref 12.3–15.4)
GLUCOSE SERPL-MCNC: 101 MG/DL (ref 65–99)
HCT VFR BLD AUTO: 39.9 % (ref 34–46.6)
HGB BLD-MCNC: 13.3 G/DL (ref 12–15.9)
IMM GRANULOCYTES # BLD AUTO: 0.09 10*3/MM3 (ref 0–0.05)
IMM GRANULOCYTES NFR BLD AUTO: 0.8 % (ref 0–0.5)
LYMPHOCYTES # BLD AUTO: 2.71 10*3/MM3 (ref 0.7–3.1)
LYMPHOCYTES NFR BLD AUTO: 24.5 % (ref 19.6–45.3)
MCH RBC QN AUTO: 31 PG (ref 26.6–33)
MCHC RBC AUTO-ENTMCNC: 33.3 G/DL (ref 31.5–35.7)
MCV RBC AUTO: 93 FL (ref 79–97)
MONOCYTES # BLD AUTO: 0.97 10*3/MM3 (ref 0.1–0.9)
MONOCYTES NFR BLD AUTO: 8.8 % (ref 5–12)
NEUTROPHILS NFR BLD AUTO: 6.79 10*3/MM3 (ref 1.7–7)
NEUTROPHILS NFR BLD AUTO: 61.3 % (ref 42.7–76)
NRBC BLD AUTO-RTO: 0 /100 WBC (ref 0–0.2)
PHOSPHATE SERPL-MCNC: 3.1 MG/DL (ref 2.5–4.5)
PLATELET # BLD AUTO: 237 10*3/MM3 (ref 140–450)
PMV BLD AUTO: 11.4 FL (ref 6–12)
POTASSIUM SERPL-SCNC: 4.3 MMOL/L (ref 3.5–5.2)
RBC # BLD AUTO: 4.29 10*6/MM3 (ref 3.77–5.28)
SODIUM SERPL-SCNC: 138 MMOL/L (ref 136–145)
WBC NRBC COR # BLD: 11.07 10*3/MM3 (ref 3.4–10.8)

## 2022-04-25 PROCEDURE — 80069 RENAL FUNCTION PANEL: CPT | Performed by: INTERNAL MEDICINE

## 2022-04-25 PROCEDURE — 99232 SBSQ HOSP IP/OBS MODERATE 35: CPT | Performed by: INTERNAL MEDICINE

## 2022-04-25 PROCEDURE — 85025 COMPLETE CBC W/AUTO DIFF WBC: CPT | Performed by: INTERNAL MEDICINE

## 2022-04-25 RX ADMIN — ATORVASTATIN CALCIUM 80 MG: 40 TABLET, FILM COATED ORAL at 20:29

## 2022-04-25 RX ADMIN — LEVOTHYROXINE SODIUM 100 MCG: 100 TABLET ORAL at 06:29

## 2022-04-25 RX ADMIN — DESVENLAFAXINE SUCCINATE 100 MG: 50 TABLET, EXTENDED RELEASE ORAL at 08:22

## 2022-04-25 RX ADMIN — DOCUSATE SODIUM 50 MG AND SENNOSIDES 8.6 MG 2 TABLET: 8.6; 5 TABLET, FILM COATED ORAL at 20:29

## 2022-04-25 RX ADMIN — ASPIRIN 81 MG 81 MG: 81 TABLET ORAL at 08:22

## 2022-04-25 RX ADMIN — Medication 10 ML: at 08:22

## 2022-04-25 RX ADMIN — CLOPIDOGREL BISULFATE 75 MG: 75 TABLET ORAL at 08:22

## 2022-04-25 RX ADMIN — LOSARTAN POTASSIUM 25 MG: 25 TABLET, FILM COATED ORAL at 08:22

## 2022-04-25 RX ADMIN — DOCUSATE SODIUM 50 MG AND SENNOSIDES 8.6 MG 2 TABLET: 8.6; 5 TABLET, FILM COATED ORAL at 08:22

## 2022-04-26 VITALS
RESPIRATION RATE: 18 BRPM | OXYGEN SATURATION: 96 % | BODY MASS INDEX: 32.08 KG/M2 | WEIGHT: 199.6 LBS | DIASTOLIC BLOOD PRESSURE: 64 MMHG | HEART RATE: 80 BPM | SYSTOLIC BLOOD PRESSURE: 123 MMHG | HEIGHT: 66 IN | TEMPERATURE: 97.7 F

## 2022-04-26 DIAGNOSIS — I48.0 PAROXYSMAL ATRIAL FIBRILLATION: Primary | ICD-10-CM

## 2022-04-26 PROBLEM — K11.8 MASS OF LEFT PAROTID GLAND: Status: ACTIVE | Noted: 2022-04-26

## 2022-04-26 PROBLEM — R41.89 COGNITIVE IMPAIRMENT: Status: ACTIVE | Noted: 2022-04-26

## 2022-04-26 PROCEDURE — 99239 HOSP IP/OBS DSCHRG MGMT >30: CPT | Performed by: INTERNAL MEDICINE

## 2022-04-26 PROCEDURE — 25010000002 HYDRALAZINE PER 20 MG: Performed by: PHYSICIAN ASSISTANT

## 2022-04-26 PROCEDURE — 92507 TX SP LANG VOICE COMM INDIV: CPT

## 2022-04-26 RX ORDER — ATORVASTATIN CALCIUM 80 MG/1
80 TABLET, FILM COATED ORAL NIGHTLY
Start: 2022-04-26 | End: 2022-05-12 | Stop reason: ALTCHOICE

## 2022-04-26 RX ORDER — CLOPIDOGREL BISULFATE 75 MG/1
75 TABLET ORAL DAILY
Qty: 30 TABLET
Start: 2022-04-27

## 2022-04-26 RX ORDER — ASPIRIN 81 MG/1
81 TABLET, CHEWABLE ORAL DAILY
Start: 2022-04-27 | End: 2023-02-15 | Stop reason: DRUGHIGH

## 2022-04-26 RX ADMIN — Medication 10 ML: at 09:04

## 2022-04-26 RX ADMIN — HYDRALAZINE HYDROCHLORIDE 10 MG: 20 INJECTION INTRAMUSCULAR; INTRAVENOUS at 03:58

## 2022-04-26 RX ADMIN — DESVENLAFAXINE SUCCINATE 100 MG: 50 TABLET, EXTENDED RELEASE ORAL at 09:03

## 2022-04-26 RX ADMIN — ASPIRIN 81 MG 81 MG: 81 TABLET ORAL at 09:03

## 2022-04-26 RX ADMIN — LEVOTHYROXINE SODIUM 100 MCG: 100 TABLET ORAL at 05:53

## 2022-04-26 RX ADMIN — LOSARTAN POTASSIUM 25 MG: 25 TABLET, FILM COATED ORAL at 09:03

## 2022-04-26 RX ADMIN — CLOPIDOGREL BISULFATE 75 MG: 75 TABLET ORAL at 09:03

## 2022-05-12 ENCOUNTER — OFFICE VISIT (OUTPATIENT)
Dept: NEUROLOGY | Facility: CLINIC | Age: 67
End: 2022-05-12

## 2022-05-12 VITALS
WEIGHT: 199 LBS | DIASTOLIC BLOOD PRESSURE: 60 MMHG | BODY MASS INDEX: 31.23 KG/M2 | OXYGEN SATURATION: 96 % | SYSTOLIC BLOOD PRESSURE: 100 MMHG | HEIGHT: 67 IN | HEART RATE: 94 BPM

## 2022-05-12 DIAGNOSIS — M25.562 LEFT KNEE PAIN, UNSPECIFIED CHRONICITY: ICD-10-CM

## 2022-05-12 DIAGNOSIS — I69.30 SEQUELAE, POST-STROKE: Primary | ICD-10-CM

## 2022-05-12 DIAGNOSIS — K11.8 MASS OF LEFT PAROTID GLAND: ICD-10-CM

## 2022-05-12 PROCEDURE — 99214 OFFICE O/P EST MOD 30 MIN: CPT | Performed by: PSYCHIATRY & NEUROLOGY

## 2022-05-12 RX ORDER — LEVOTHYROXINE SODIUM 0.1 MG/1
100 TABLET ORAL DAILY
COMMUNITY
Start: 2022-05-11

## 2022-05-12 RX ORDER — PANTOPRAZOLE SODIUM 40 MG/1
40 TABLET, DELAYED RELEASE ORAL DAILY
COMMUNITY
Start: 2022-05-11

## 2022-05-12 RX ORDER — SIMVASTATIN 40 MG
40 TABLET ORAL NIGHTLY
COMMUNITY
Start: 2022-05-11 | End: 2022-08-19

## 2022-05-12 RX ORDER — LOSARTAN POTASSIUM 50 MG/1
50 TABLET ORAL DAILY
COMMUNITY
Start: 2022-05-11

## 2022-05-12 RX ORDER — TRAZODONE HYDROCHLORIDE 150 MG/1
TABLET ORAL
COMMUNITY
Start: 2022-05-11 | End: 2022-08-19

## 2022-06-08 ENCOUNTER — TRANSCRIBE ORDERS (OUTPATIENT)
Dept: ADMINISTRATIVE | Facility: HOSPITAL | Age: 67
End: 2022-06-08

## 2022-06-08 DIAGNOSIS — K11.8 PAROTID MASS: Primary | ICD-10-CM

## 2022-06-09 ENCOUNTER — OFFICE VISIT (OUTPATIENT)
Dept: ORTHOPEDIC SURGERY | Facility: CLINIC | Age: 67
End: 2022-06-09

## 2022-06-09 VITALS
SYSTOLIC BLOOD PRESSURE: 128 MMHG | WEIGHT: 191.2 LBS | DIASTOLIC BLOOD PRESSURE: 86 MMHG | BODY MASS INDEX: 30.01 KG/M2 | HEIGHT: 67 IN

## 2022-06-09 DIAGNOSIS — Q78.4 OSTEOCHONDROMATOSIS, MULTIPLE: ICD-10-CM

## 2022-06-09 DIAGNOSIS — M17.12 PRIMARY OSTEOARTHRITIS OF LEFT KNEE: ICD-10-CM

## 2022-06-09 DIAGNOSIS — M25.562 LEFT KNEE PAIN, UNSPECIFIED CHRONICITY: Primary | ICD-10-CM

## 2022-06-09 PROCEDURE — 99204 OFFICE O/P NEW MOD 45 MIN: CPT | Performed by: ORTHOPAEDIC SURGERY

## 2022-06-09 PROCEDURE — 20610 DRAIN/INJ JOINT/BURSA W/O US: CPT | Performed by: ORTHOPAEDIC SURGERY

## 2022-06-09 RX ORDER — TRIAMCINOLONE ACETONIDE 40 MG/ML
40 INJECTION, SUSPENSION INTRA-ARTICULAR; INTRAMUSCULAR
Status: COMPLETED | OUTPATIENT
Start: 2022-06-09 | End: 2022-06-09

## 2022-06-09 RX ORDER — LIDOCAINE HYDROCHLORIDE 10 MG/ML
3 INJECTION, SOLUTION EPIDURAL; INFILTRATION; INTRACAUDAL; PERINEURAL
Status: COMPLETED | OUTPATIENT
Start: 2022-06-09 | End: 2022-06-09

## 2022-06-09 RX ADMIN — TRIAMCINOLONE ACETONIDE 40 MG: 40 INJECTION, SUSPENSION INTRA-ARTICULAR; INTRAMUSCULAR at 15:59

## 2022-06-09 RX ADMIN — LIDOCAINE HYDROCHLORIDE 3 ML: 10 INJECTION, SOLUTION EPIDURAL; INFILTRATION; INTRACAUDAL; PERINEURAL at 15:59

## 2022-06-09 NOTE — PROGRESS NOTES
[facility]        Subjective     Pain of the Left Knee      HPI    Batsheva Barahona is a 66 y.o. female who presents with new problem of: left knee pain.  Onset: atraumatic and gradual in nature. The issue has been ongoing for 1 month(s). Pain is a 6/10 on the pain scale. Pain is described as dull and aching. Associated symptoms include pain, popping and giving way/buckling. The pain is worse with walking and standing and rising from a seated position nothing improve the pain. Previous treatments have included: cane/walker.    I have reviewed the following portions of the patient's history:History of Present Illness and review of systems.      Patient here today for new problem day regarding her left knee.  This has been bothering her for about 1 to 2 months.  She is here with her son who is an x-ray tech.  She has had a stroke and went to [facility].  She has a history of familial osteochondromatosis.      Past Medical History:   Diagnosis Date   • Anxiety    • Bowel trouble    • CAD (coronary artery disease)    • CTS (carpal tunnel syndrome)    • Depression    • Difficulty walking    • Dizziness    • History of heart valve replacement    • HL (hearing loss)    • Hypertension    • Memory loss    • Migraine    • Numbness and tingling    • Thyroid disease    • Weakness       Past Surgical History:   Procedure Laterality Date   • AORTIC VALVE REPAIR/REPLACEMENT     • BONE SPUR ARM      Elbow   •  SECTION        Family History   Problem Relation Age of Onset   • Aneurysm Mother    • Cancer Mother    • Stroke Mother    • Migraines Sister    • Migraines Brother    • Cancer Maternal Grandmother    • Dementia Maternal Grandmother      Social History     Socioeconomic History   • Marital status:    Tobacco Use   • Smoking status: Former Smoker     Types: Cigarettes   • Smokeless tobacco: Never Used   • Tobacco comment: quit    Vaping Use   • Vaping Use: Never used  "  Substance and Sexual Activity   • Alcohol use: Never   • Drug use: Never   • Sexual activity: Defer      Current Outpatient Medications on File Prior to Visit   Medication Sig Dispense Refill   • aspirin 81 MG chewable tablet Chew 1 tablet Daily.     • clopidogrel (PLAVIX) 75 MG tablet Take 1 tablet by mouth Daily. 30 tablet    • desvenlafaxine (PRISTIQ) 100 MG 24 hr tablet Take 1 tablet by mouth Daily. 30 tablet 6   • levocetirizine (XYZAL) 5 MG tablet      • levothyroxine (SYNTHROID, LEVOTHROID) 100 MCG tablet Take 100 mcg by mouth Daily.     • losartan (COZAAR) 50 MG tablet Take 50 mg by mouth Daily.     • meclizine (ANTIVERT) 25 MG tablet      • pantoprazole (PROTONIX) 40 MG EC tablet Take 40 mg by mouth Daily.     • simvastatin (ZOCOR) 40 MG tablet Take 40 mg by mouth Every Night.     • traZODone (DESYREL) 150 MG tablet        No current facility-administered medications on file prior to visit.      Allergies   Allergen Reactions   • Codeine Other (See Comments)     \" feels like im going to pass out\"          Review of Systems   Constitutional: Negative.    HENT: Negative.    Eyes: Negative.    Respiratory: Negative.    Cardiovascular: Negative.    Gastrointestinal: Negative.    Endocrine: Negative.    Genitourinary: Negative.    Musculoskeletal: Positive for arthralgias.   Skin: Negative.    Allergic/Immunologic: Negative.    Neurological: Negative.    Hematological: Negative.    Psychiatric/Behavioral: Negative.         I reviewed the patient's chief complaint, history of present illness, review of systems, past medical history, surgical history, family history, social history, medications and allergy list.        Objective      Physical Exam  /86   Ht 168.9 cm (66.5\")   Wt 86.7 kg (191 lb 3.2 oz)   BMI 30.40 kg/m²     Body mass index is 30.4 kg/m².    General  Mental Status - alert  General Appearance - cooperative, well groomed, not in acute distress  Orientation - Oriented X3  Build & Nutrition " - well developed and well nourished  Posture - normal posture  Gait -uses a cane       Ortho Exam      Musculoskeletal:  Global Assessment:  Overall assessment of Lower Extremity Muscle Strength and Tone:  Left quadriceps--5/5   Left hamstrings--5/5       Left tibialis anterior--5/5  Left gastroc-soleus--5/5  Left EHL --5/5    Lower Extremity:    Inspection and Palpation:  Left knee:  Tenderness:  Over the medial joint line and moderate severity  Effusion:  1+  Crepitus:  Positive  Pulses:  2+  Ecchymosis:  None  Warmth:  None     ROM:  Left:  Extension: 5     Flexion:120    Instability:    Left:    Lachman Test:  Negative   Varus stress test negative  Valgus stress test negative    Deformities/Malalignments/Discrepancies:    Left:  Genu Varum     Functional Testing:  Mg's test:  Negative  Patella grind test:  Positive  Q-angle:  normal      Imaging/Studies  Imaging Results (Last 24 Hours)     Procedure Component Value Units Date/Time    XR Knee 4+ View Left [[id number]] Resulted: 06/09/22 1600     Updated: 06/09/22 1601    Narrative:      Knee X-Ray    Indication: Pain    Study:  Upright AP, Skiers, Lateral, and Sunrise views of Left knee(s)    Comparison: None    Findings:  There are changes throughout both distal femurs and proximal tibia and   fibula consistent with familial osteochondromatosis  Patient appears to have moderate to early severe degenerative changes in   the medial compartment.    There are mild degenerative changes in the lateral compartment.    There are moderate changes in the patellofemoral compartment.    Patient has overall varus alignment.    Kellgren-Alfredo ndGndrndanddndend:nd nd2nd Impression:   Moderate to early severe medial compartment and moderate patellofemoral   compartment degnerative changes of the knee   Radiographic evidence of familial osteochondromatosis.              Assessment    Assessment:  1. Left knee pain, unspecified chronicity    2. Primary osteoarthritis of left knee     3. Osteochondromatosis, multiple        Plan:  1. Continue over-the-counter medication as needed for discomfort  2. Left knee arthritis in the face of familial osteochondromatosis--plan will be for steroid injection today and a medial compartment offloading brace.  I will see her back in 3 months and if the injection has been good for her, this can be repeated.  If it fails to have given her meaningful relief, then we can try a single shot Visco supplement.    Procedure Note:    I discussed with the patient the potential benefits of performing a therapeutic injections as well as potential risks including but not limited to infection, swelling, pain, bleeding, bruising, nerve/vessel damage, skin color changes, transient elevation in blood glucose levels, and fat atrophy. After informed consent and after the areas were prepped with chlorhexadine soap, ethyl chloride was used to numb the skin. Via the anterolateral approach, 3mL of 1% lidocaine followed by 40mg of Kenalog were each injected into the left knee joint. The patient tolerated the procedure well. There were no complications. A sterile dressing was placed over the injection sites.          Dewey Fried MD  06/09/22  16:06 EDT      Dictated Utilizing Dragon Dictation.

## 2022-07-18 ENCOUNTER — HOSPITAL ENCOUNTER (OUTPATIENT)
Dept: CT IMAGING | Facility: HOSPITAL | Age: 67
Discharge: HOME | End: 2022-07-18
Admitting: OTOLARYNGOLOGY

## 2022-07-18 DIAGNOSIS — K11.8 PAROTID MASS: ICD-10-CM

## 2022-07-18 LAB — CREAT BLDA-MCNC: 0.8 MG/DL (ref 0.6–1.3)

## 2022-07-18 PROCEDURE — 82565 ASSAY OF CREATININE: CPT

## 2022-07-18 PROCEDURE — 70491 CT SOFT TISSUE NECK W/DYE: CPT

## 2022-07-18 PROCEDURE — 25010000002 IOPAMIDOL 61 % SOLUTION: Performed by: OTOLARYNGOLOGY

## 2022-07-18 RX ADMIN — IOPAMIDOL 80 ML: 612 INJECTION, SOLUTION INTRAVENOUS at 15:55

## 2022-08-05 ENCOUNTER — TELEPHONE (OUTPATIENT)
Dept: NEUROLOGY | Facility: CLINIC | Age: 67
End: 2022-08-05

## 2022-08-05 NOTE — TELEPHONE ENCOUNTER
Provider: DARLINE  Caller: CHASE EMERSON  Relationship to Patient: SON  Pharmacy: N/A  Phone Number: [phone]  Reason for Call: PATIENT SON TELEPHONED TO ADVISE PATIENT PASSED 8/2/22 (WAS OUT AROUND 4 - 5 MINUTES) IN THE SHOWER; WAS TAKEN TO THE E.R.; HEMOGLOBIN WAS 6.3; IMAGING DID NOT SHOW ANY STROKE; WAS GIVEN TRANSFUSION.    PATIENT WAS ADMITTED OVERNIGHT; DISCHARGED ON 8/4/22; TAKEN OFF PLAVIX UNTIL PATIENT FOLLOWS UP W/HER PCP (8/15/22); PRESCRIBED IRON PILLS; PATIENT SEEMS TO BE FINE NOW.    SON WANTED PROVIDER TO BE MADE AWARE.    CALL WITH ANY QUESTIONS.    THANK YOU.

## 2022-08-08 NOTE — TELEPHONE ENCOUNTER
Called patient's son to let him know Dr. Hyde recommended to monitor patient's blood pressure. He appreciate the call and had a verbal understanding.

## 2022-08-08 NOTE — TELEPHONE ENCOUNTER
Please tell the patient's son to also monitor her blood pressure regularly because drops in blood pressure can also make her pass out

## 2022-08-15 ENCOUNTER — TELEPHONE (OUTPATIENT)
Dept: NEUROLOGY | Facility: CLINIC | Age: 67
End: 2022-08-15

## 2022-08-15 NOTE — TELEPHONE ENCOUNTER
PT IS SEEING DR NEGAR SMITH TODAY FOR A 2ND OPINION AT HER FAMILY'S REQUEST AND DR BILLS WANTED A VERBAL APPROVAL FROM DR GREGORIO BEFORE SEENNG THE PT.    WARM TRANSFERRED PT TO [facility] FOR APPROVAL.

## 2022-08-18 ENCOUNTER — TELEPHONE (OUTPATIENT)
Dept: NEUROLOGY | Facility: CLINIC | Age: 67
End: 2022-08-18

## 2022-08-18 NOTE — TELEPHONE ENCOUNTER
Called patient letting him know that Dr. Li was concern on why he cancel the daljit. If his mom was having so many issues? Let him know Dr. Hyde was able to see his mom today if he could make it by 3:45, he said was to far away and not able to make it until 4:30.     Notified Dr. Hyde about it and she was able to have a spot for her tomorrow at 9:30.     Ask the son if he was able to come at that time tomorrow, he said yes! Scheduled patient for tomorrow 8/19/2022 at 9:30am

## 2022-08-18 NOTE — TELEPHONE ENCOUNTER
Caller: CHASE    Relationship: SON    Best call back number: [phone]    What is the best time to reach you: ANYTIME    What was the call regarding: CHASE CALLED IN STATED MOTHER (PATIENT) HAD RECENT ER TRIP AND WAS TAKEN OFF OF HER BLOOD THINNER MEDICATION, HE IS WORRIED THIS IS SOMETHING SHE SHOULD HAVE NOT BEEN TAKEN OFF OF. SON (CHASE) IS REQUESTING A CALL BACK AND WANTS DR. GREGORIO AND STAFF TO KNOW PATIENT HAD RECENT LABS ON 08/15/2022 AND WOULD LIKE THOSE TO BE LOOKED AT BEFORE A CALL BACK ALONG WITH THE RECENT MRI PERFORMED AT THE HOSPITAL DURING THE STAY. PLEASE REVIEW AND ADVISE.     CHASE IS ALSO REQUESTING A SOONER APPOINTMENT THAN THE NOW SCHEDULED October APPOINTMENT. PLEASE ADVISE. PATIENT IS ON WAIT LIST.     Do you require a callback: YES, ASAP

## 2022-08-18 NOTE — TELEPHONE ENCOUNTER
Please tell the son that his mother had an appointment with me today.  Why did they cancel it if she had so many issues?  If they can make it by 3:45 today I can still see the patient

## 2022-08-19 ENCOUNTER — OFFICE VISIT (OUTPATIENT)
Dept: NEUROLOGY | Facility: CLINIC | Age: 67
End: 2022-08-19

## 2022-08-19 VITALS
BODY MASS INDEX: 30.37 KG/M2 | HEART RATE: 71 BPM | WEIGHT: 191 LBS | DIASTOLIC BLOOD PRESSURE: 68 MMHG | SYSTOLIC BLOOD PRESSURE: 116 MMHG | OXYGEN SATURATION: 94 %

## 2022-08-19 DIAGNOSIS — I69.30 SEQUELAE, POST-STROKE: Primary | ICD-10-CM

## 2022-08-19 DIAGNOSIS — F01.50 VASCULAR DEMENTIA WITHOUT BEHAVIORAL DISTURBANCE: ICD-10-CM

## 2022-08-19 DIAGNOSIS — R42 DIZZINESS: ICD-10-CM

## 2022-08-19 PROCEDURE — 99215 OFFICE O/P EST HI 40 MIN: CPT | Performed by: PSYCHIATRY & NEUROLOGY

## 2022-08-19 RX ORDER — DONEPEZIL HYDROCHLORIDE 5 MG/1
5 TABLET, FILM COATED ORAL NIGHTLY
Qty: 30 TABLET | Refills: 2 | Status: SHIPPED | OUTPATIENT
Start: 2022-08-19 | End: 2022-10-20

## 2022-08-19 RX ORDER — SIMVASTATIN 20 MG
20 TABLET ORAL EVERY EVENING
Qty: 30 TABLET | Refills: 11 | Status: SHIPPED | OUTPATIENT
Start: 2022-08-19 | End: 2022-09-14 | Stop reason: SDUPTHER

## 2022-08-19 NOTE — PROGRESS NOTES
Subjective:    CC: Batsheva Barahona is seen today  for Stroke       Current visit- she had had another episode of nearly passing out when she was in the shower earlier this month.  She was taken to [facility] [place] where her initial hemoglobin was 6.3.  She was transfused 2 units of blood.  Her last hemoglobin on 8/15 was 11.1.  Her Plavix was also stopped and she was just continued on aspirin along with simvastatin 40 mg daily.  Her lipid panel was as follows-, , LDL 82, HDL 46, ferritin was on the lower side of normal, T4, TSH was normal, vitamin D was 37, B12 was 388 and urine analysis was negative.  She also had a CT head and MRI brain that showed chronic infarcts but no acute abnormalities.  As per son patient continues to have short-term memory problems with periods of confusion where she refers to her son in the third person while talking to him or thinks she has a second PET.  Is more confused when she is stressed out or when she wakes up after a nap.  Is also reluctant to take a bath and her son has to help her with that.  Since being back home she is yet to resume speech therapy/PT.  She sleeps fairly well despite stopping trazodone.  Does get up a few times at night to go to the bathroom as she is scared she may have an accident.  Could not tolerate pull-ups.  Of note- I reviewed all of her notes including blood work from [facility] [place].  I also reviewed her previous Holter monitor that showed occasional PACs and PVCs but no atrial fibrillation.    Last visit-after her last visit in March patient sustained a fall in the bathtub in April when she passed out for a few minutes.  She was also more confused than usual.  She had her outpatient MRI brain a few days later that showed an acute infarct in the right centrum semiovale as well as chronic infarcts in the right middle cerebral peduncle and possibly in the left thalamus.  After that she got admitted to the  [facility] on 4/22 where she had a CT angiogram of the head and neck that showed left vert origin stenosis (not very convincing) but no other acute intracranial or extracranial stenosis.  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  Echocardiogram showed a normal EF with normal left atrial size and no PFO.  Patient was started on Plavix in addition to aspirin and continued on simvastatin 40 mg.  Her lipid panel was as follows-, , LDL 77, HDL 40.  A1c was 5.8 and TSH is back down to 1.15.  Patient went to [facility] where she received PT/OT/speech therapy for almost 3 weeks.  She recently turned in her Holter monitor the results of which are still pending.  She is still having cognitive difficulties.  [facility] has also given her an outpatient PT/speech therapy referral.  Patient denies having any passing out spells.  Her blood pressure is now well controlled.  She has also been taking Pristiq 100 mg for her mood.  She has also been complaining of severe left knee pain for over 1 month.  Is able to walk long distances because of that.  Of note-I personally reviewed her MRI brain with the patient and her son.    Initial [id number]-old female accompanied by her son with a history of postsurgical hypothyroidism, hyperlipidemia, hypertension, aortic valve replacement, depression presents with memory problems, dizziness.  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  After [employer] she started to have worsening depression because of not having to do much .  Then about 6 to 8 months ago she stopped taking all of her medications as she did not think anything was helping including her thyroid, cholesterol, antidepressant and blood pressure meds.  Since then she has been extremely forgetful especially with her short-term memory.  She also gets agitated/irritable and sleeps a lot.  As per son she also has word finding difficulties and gets confused  easily for example while ordering food from me drive-through patient takes a lot of time in deciding what she will eat.  She can still carry out her ADLs including stopped paying the bills now (son has taken over).  She has also stopped driving.  She had recently including a TSH level that was 47.8!!  B12 was 302, lipid panel was as follows-, , .  She is also complaining of numbness in her fingertips as well as lightheadedness/dizziness on walking.  In May 2019 she felt dizzy and then passed out in the bathroom.  She was taken to [facility] at the time and told that she had a vasovagal episode.  Denies any recent episodes with loss of consciousness.    The following portions of the patient's history were reviewed today and updated as of 03/04/2022  : allergies, current medications, past family history, past medical history, past social history, past surgical history and problem list  These document will be scanned to patient's chart.      Current Outpatient Medications:   •  aspirin 81 MG chewable tablet, Chew 1 tablet Daily., Disp: , Rfl:   •  clopidogrel (PLAVIX) 75 MG tablet, Take 1 tablet by mouth Daily., Disp: 30 tablet, Rfl:   •  cyanocobalamin ([employer] Vitamin B-12) 1000 MCG tablet, Take 1 tablet by mouth Daily., Disp: 100 tablet, Rfl: 2  •  desvenlafaxine (PRISTIQ) 100 MG 24 hr tablet, Take 1 tablet by mouth Daily., Disp: 30 tablet, Rfl: 6  •  donepezil (Aricept) 5 MG tablet, Take 1 tablet by mouth Every Night., Disp: 30 tablet, Rfl: 2  •  levothyroxine (SYNTHROID, LEVOTHROID) 100 MCG tablet, Take 100 mcg by mouth Daily., Disp: , Rfl:   •  losartan (COZAAR) 50 MG tablet, Take 50 mg by mouth Daily., Disp: , Rfl:   •  pantoprazole (PROTONIX) 40 MG EC tablet, Take 40 mg by mouth Daily., Disp: , Rfl:   •  simvastatin (Zocor) 20 MG tablet, Take 1 tablet by mouth Every Evening., Disp: 30 tablet, Rfl: 11   Past Medical History:   Diagnosis Date   • Anxiety    • Bowel trouble    • CAD  (coronary artery disease)    • CTS (carpal tunnel syndrome)    • Depression    • Difficulty walking    • Dizziness    • History of heart valve replacement    • HL (hearing loss)    • Hypertension    • Memory loss    • Migraine    • Numbness and tingling    • Thyroid disease    • Weakness       Past Surgical History:   Procedure Laterality Date   • AORTIC VALVE REPAIR/REPLACEMENT     • BONE SPUR ARM      [place]   •  SECTION        Family History   Problem Relation Age of Onset   • Aneurysm Mother    • Cancer Mother    • Stroke Mother    • Migraines Sister    • Migraines Brother    • Cancer Maternal Grandmother    • Dementia Maternal Grandmother       Social History     Socioeconomic History   • Marital status:    Tobacco Use   • Smoking status: Former Smoker     Types: Cigarettes   • Smokeless tobacco: Never Used   • Tobacco comment: quit 2013   Vaping Use   • Vaping Use: Never used   Substance and Sexual Activity   • Alcohol use: Never   • Drug use: Never   • Sexual activity: Defer     Review of Systems   Neurological: Positive for dizziness, memory problem and confusion.   All other systems reviewed and are negative.      Objective:    /68   Pulse 71   Wt 86.6 kg (191 lb)   SpO2 94%   BMI 30.37 kg/m²     Neurology Exam:    General apperance: NAD.  Orthostatics at her last visit were positive    Mental status: Alert, awake and oriented to  place and person.     Recent and Remote memory: Impaired.  MMSE previously of 15/30 today with a delayed recall of 0/3.  At the previous visit-17/20 with a delayed recall of 0/3    Attention span and Concentration: Normal.     Language and Speech: Intact- No dysarthria.    Fluency, Naming , Repitition and Comprehension:  Intact    Cranial Nerves:   CN II: Visual fields are full. Intact. Fundi - Normal, No papillederma, Pupils - JUDE  CN III, IV and VI: Extraocular movements are intact. Normal saccades.   CN V: Facial sensation is intact.   CN VII: Muscles  of facial expression reveal no asymmetry. Intact.   CN VIII: Hearing is intact. Whispered voice intact.   CN IX and X: Palate elevates symmetrically. Intact  CN XI: Shoulder shrug is intact.   CN XII: Tongue is midline without evidence of atrophy or fasciculation.     Ophthalmoscopic exam of optic disc-normal    Motor:  Right UE muscle strength 5/5. Normal tone.     Left UE muscle strength 5/5. Normal tone.      Right LE muscle strength5/5. Normal tone.     Left LE muscle strength 5/5. Normal tone.      Sensory: Normal light touch, vibration and pinprick sensation bilaterally.    DTRs: 2+ bilaterally in upper and lower extremities.    Babinski: Negative bilaterally.    Co-ordination: Normal finger-to-nose, heel to shin B/L.    Rhomberg: Negative.    Gait: Mildly antalgic gait due to left knee pain.  Using a walker    Cardiovascular: Regular rate and rhythm without murmur, gallop or rub.    Assessment and Plan:  1.  Sequelae post stroke  Patient had an acute right centrum semiovale stroke and also has chronic infarcts in the right cerebral peduncle and possibly left thalamus.  CT angiogram shows possible left vert stenosis at its origin but no intracranial stenosis  Holter monitor did not show any atrial fibrillation.  Recent MRI brain did not show any new strokes  I have asked her to continue aspirin 81 mg daily for now.    She can reduce her simvastatin to 20 mg for goal LDL of less than 100.  Her LDL was 82  I have told her to make dietary modifications for her prediabetes.  A1c of 5.8  Goal A1c less than 130/90.    I have asked her to resume speech therapy    2. Dizziness  Her dizziness with presyncopal or syncopal episodes could be due to severe anemia as well as orthostatic hypotension which was positive in the past  Her anemia has improved and her last hemoglobin was 11.1.  She is taking iron supplements and pantoprazole  I have told her to cut back on her losartan to 25 mg daily but if her episodes of  dizziness do not subside I will start her on low doses of midodrine    3.  Vascular dementia  Her cognitive problems are most likely due to multiple strokes  I will start her on Aricept 5 mg daily  I have again counseled her to carry out mental exercises such as reading, solving crossword puzzles etc.  I have also told her to take vitamin B12 supplements as her level was low    4.  Left parotid mass  Has been following up with ENT    Return in about 2 months (around 10/19/2022).     I spent over 45 minutes with the patient face to face out of which over 50% (30 minutes) was spent in management    Siomara Hyde MD

## 2022-09-15 ENCOUNTER — TELEPHONE (OUTPATIENT)
Dept: ORTHOPEDIC SURGERY | Facility: CLINIC | Age: 67
End: 2022-09-15

## 2022-09-15 RX ORDER — SIMVASTATIN 20 MG
20 TABLET ORAL EVERY EVENING
Qty: 90 TABLET | Refills: 3 | Status: SHIPPED | OUTPATIENT
Start: 2022-09-15 | End: 2023-09-15

## 2022-09-15 NOTE — TELEPHONE ENCOUNTER
Called patient about missed appointment 9/13,LVM and reminded patient about our 24 hour cancellation notice.

## 2022-10-20 ENCOUNTER — OFFICE VISIT (OUTPATIENT)
Dept: NEUROLOGY | Facility: CLINIC | Age: 67
End: 2022-10-20

## 2022-10-20 VITALS — BODY MASS INDEX: 30.21 KG/M2 | OXYGEN SATURATION: 96 % | WEIGHT: 190 LBS

## 2022-10-20 DIAGNOSIS — F01.50 VASCULAR DEMENTIA WITHOUT BEHAVIORAL DISTURBANCE: ICD-10-CM

## 2022-10-20 DIAGNOSIS — I69.30 SEQUELAE, POST-STROKE: Primary | ICD-10-CM

## 2022-10-20 DIAGNOSIS — R42 DIZZINESS: ICD-10-CM

## 2022-10-20 PROCEDURE — 99214 OFFICE O/P EST MOD 30 MIN: CPT | Performed by: PSYCHIATRY & NEUROLOGY

## 2022-10-20 RX ORDER — MIDODRINE HYDROCHLORIDE 2.5 MG/1
2.5 TABLET ORAL
Qty: 60 TABLET | Refills: 3 | Status: SHIPPED | OUTPATIENT
Start: 2022-10-20

## 2022-10-20 RX ORDER — DONEPEZIL HYDROCHLORIDE 10 MG/1
10 TABLET, FILM COATED ORAL NIGHTLY
Qty: 30 TABLET | Refills: 11 | Status: SHIPPED | OUTPATIENT
Start: 2022-10-20 | End: 2023-10-20

## 2022-10-20 NOTE — PROGRESS NOTES
Subjective:    CC: Batsheva Barahona is seen today  for Tremors       Current visit- as per patient's son she has been less confused since starting Aricept 5 mg daily.  She has also stopped seeing things.  Is still having urinary incontinence more so last week when they were traveling and had gone out of town.  During that 1 week she had several accidents.  She also has occasional lightheadedness/postural dizziness but denies passing out.  Did cut back on her losartan.  Her hemoglobin has been stable.  Also denies any strokelike symptoms.  Continues to take aspirin 81 mg along with simvastatin 40 mg daily.  She does have occasional dull headaches bilaterally.    Last visit-she had had another episode of nearly passing out when she was in the shower earlier this month.  She was taken to [facility] where her initial hemoglobin was 6.3.  She was transfused 2 units of blood.  Her last hemoglobin on 8/15 was 11.1.  Her Plavix was also stopped and she was just continued on aspirin along with simvastatin 40 mg daily.  Her lipid panel was as follows-, , LDL 82, HDL 46, ferritin was on the lower side of normal, T4, TSH was normal, vitamin D was 37, B12 was 388 and urine analysis was negative.  She also had a CT head and MRI brain that showed chronic infarcts but no acute abnormalities.  As per son patient continues to have short-term memory problems with periods of confusion where she refers to her son in the third person while talking to him or thinks she has a second PET.  Is more confused when she is stressed out or when she wakes up after a nap.  Is also reluctant to take a bath and her son has to help her with that.  Since being back home she is yet to resume speech therapy/PT.  She sleeps fairly well despite stopping trazodone.  Does get up a few times at night to go to the bathroom as she is scared she may have an accident.  Could not tolerate pull-ups.  Of note- I reviewed all of  her notes including blood work from [facility].  I also reviewed her previous Holter monitor that showed occasional PACs and PVCs but no atrial fibrillation.    Last visit-after her last visit in March patient sustained a fall in the bathtub in April when she passed out for a few minutes.  She was also more confused than usual.  She had her outpatient MRI brain a few days later that showed an acute infarct in the right centrum semiovale as well as chronic infarcts in the right middle cerebral peduncle and possibly in the left thalamus.  After that she got admitted to the hospital on 4/22 where she had a CT angiogram of the head and neck that showed left vert origin stenosis (not very convincing) but no other acute intracranial or extracranial stenosis.  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  Echocardiogram showed a normal EF with normal left atrial size and no PFO.  Patient was started on Plavix in addition to aspirin and continued on simvastatin 40 mg.  Her lipid panel was as follows-, , LDL 77, HDL 40.  A1c was 5.8 and TSH is back down to 1.15.  Patient went to [facility] where she received PT/OT/speech therapy for almost 3 weeks.  She recently turned in her Holter monitor the results of which are still pending.  She is still having cognitive difficulties.  [facility] has also given her an outpatient PT/speech therapy referral.  Patient denies having any passing out spells.  Her blood pressure is now well controlled.  She has also been taking Pristiq 100 mg for her mood.  She has also been complaining of severe left knee pain for over 1 month.  Is able to walk long distances because of that.  Of note-I personally reviewed her MRI brain with the patient and her son.    Initial [id number]-old female accompanied by her son with a history of postsurgical hypothyroidism, hyperlipidemia, hypertension, aortic valve replacement, depression presents with memory problems,  dizziness.  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  After custodial she started to have worsening depression because of not having to do much .  Then about 6 to 8 months ago she stopped taking all of her medications as she did not think anything was helping including her thyroid, cholesterol, antidepressant and blood pressure meds.  Since then she has been extremely forgetful especially with her short-term memory.  She also gets agitated/irritable and sleeps a lot.  As per son she also has word finding difficulties and gets confused easily for example while ordering food from me drive-through patient takes a lot of time in deciding what she will eat.  She can still carry out her ADLs including stopped paying the bills now (son has taken over).  She has also stopped driving.  She had recently including a TSH level that was 47.8!!  B12 was 302, lipid panel was as follows-, , .  She is also complaining of numbness in her fingertips as well as lightheadedness/dizziness on walking.  In May 2019 she felt dizzy and then passed out in the bathroom.  She was taken to [facility] at the time and told that she had a vasovagal episode.  Denies any recent episodes with loss of consciousness.    The following portions of the patient's history were reviewed today and updated as of 03/04/2022  : allergies, current medications, past family history, past medical history, past social history, past surgical history and problem list  These document will be scanned to patient's chart.      Current Outpatient Medications:   •  losartan (COZAAR) 50 MG tablet, Take 1 tablet by mouth Daily. 25 mg daily, Disp: , Rfl:   •  aspirin 81 MG chewable tablet, Chew 1 tablet Daily., Disp: , Rfl:   •  clopidogrel (PLAVIX) 75 MG tablet, Take 1 tablet by mouth Daily., Disp: 30 tablet, Rfl:   •  cyanocobalamin ([employer] Vitamin B-12) 1000 MCG tablet, Take 1 tablet by  mouth Daily., Disp: 100 tablet, Rfl: 2  •  desvenlafaxine (PRISTIQ) 100 MG 24 hr tablet, Take 1 tablet by mouth Daily., Disp: 30 tablet, Rfl: 6  •  donepezil (Aricept) 10 MG tablet, Take 1 tablet by mouth Every Night., Disp: 30 tablet, Rfl: 11  •  levothyroxine (SYNTHROID, LEVOTHROID) 100 MCG tablet, Take 100 mcg by mouth Daily., Disp: , Rfl:   •  midodrine (PROAMATINE) 2.5 MG tablet, Take 1 tablet by mouth 2 (Two) Times a Day Before Meals., Disp: 60 tablet, Rfl: 3  •  pantoprazole (PROTONIX) 40 MG EC tablet, Take 40 mg by mouth Daily., Disp: , Rfl:   •  simvastatin (Zocor) 20 MG tablet, Take 1 tablet by mouth Every Evening., Disp: 90 tablet, Rfl: 3   Past Medical History:   Diagnosis Date   • Anxiety    • Bowel trouble    • CAD (coronary artery disease)    • CTS (carpal tunnel syndrome)    • Depression    • Difficulty walking    • Dizziness    • History of heart valve replacement    • HL (hearing loss)    • Hypertension    • Memory loss    • Migraine    • Numbness and tingling    • Thyroid disease    • Weakness       Past Surgical History:   Procedure Laterality Date   • AORTIC VALVE REPAIR/REPLACEMENT     • BONE SPUR ARM      [place]bow   •  SECTION        Family History   Problem Relation Age of Onset   • Aneurysm Mother    • Cancer Mother    • Stroke Mother    • Migraines Sister    • Migraines Brother    • Cancer Maternal Grandmother    • Dementia Maternal Grandmother       Social History     Socioeconomic History   • Marital status:    Tobacco Use   • Smoking status: Former     Types: Cigarettes   • Smokeless tobacco: Never   • Tobacco comments:     quit 2013   Vaping Use   • Vaping Use: Never used   Substance and Sexual Activity   • Alcohol use: Never   • Drug use: Never   • Sexual activity: Defer     Review of Systems   Neurological: Positive for dizziness, memory problem and confusion.   All other systems reviewed and are negative.      Objective:    Wt 86.2 kg (190 lb)   SpO2 96%   BMI 30.21  kg/m²     Neurology Exam:    General apperance: Mild masklike facies.  Orthostatics this time were positive.  Blood pressure on lying down was 140/72, 94, on sitting up 128/62, 83 and on standing up 110/60, 104    Mental status: Alert, awake and oriented to  place and person.  Could tell me the year, her date of birth and where she lives.  Had difficulty with the month and her son's date of birth    Recent and Remote memory: Impaired.  MMSE previously of 15/30 with a delayed recall of 0/3.  At the previous visit-17/20 with a delayed recall of 0/3    Attention span and Concentration: Normal.     Language and Speech: Intact- No dysarthria.    Fluency, Naming , Repitition and Comprehension:  Intact    Cranial Nerves:   CN II: Visual fields are full. Intact. Fundi - Normal, No papillederma, Pupils - JUDE  CN III, IV and VI: Extraocular movements are intact. Normal saccades.   CN V: Facial sensation is intact.   CN VII: Muscles of facial expression reveal no asymmetry. Intact.   CN VIII: Hearing is intact. Whispered voice intact.   CN IX and X: Palate elevates symmetrically. Intact  CN XI: Shoulder shrug is intact.   CN XII: Tongue is midline without evidence of atrophy or fasciculation.     Ophthalmoscopic exam of optic disc-normal    Motor:  Right UE muscle strength 5/5. Normal tone.     Left UE muscle strength 5/5. Normal tone.      Right LE muscle strength5/5. Normal tone.     Left LE muscle strength 5/5. Normal tone.      Sensory: Normal light touch, vibration and pinprick sensation bilaterally.    DTRs: 2+ bilaterally in upper and lower extremities.    Babinski: Negative bilaterally.    Co-ordination: Normal finger-to-nose, heel to shin B/L.    Rhomberg: Negative.    Gait: Slow and cautious with slightly reduced feet clearance but no shuffling noted.  Using a cane today    Cardiovascular: Regular rate and rhythm without murmur, gallop or rub.    Assessment and Plan:  1.  Sequelae post stroke  Patient had an acute  right centrum semiovale stroke and also has chronic infarcts in the right cerebral peduncle and possibly left thalamus.  CT angiogram shows possible left vert stenosis at its origin but no intracranial stenosis  Holter monitor did not show any atrial fibrillation.  Recent MRI brain did not show any new strokes  I have asked her to increase her aspirin back to 325 mg daily as it will help with her headaches as well.  After a month she should get her CBC rechecked to make sure it is stable  Continue simvastatin 20 mg for goal LDL of less than 100.  Her LDL was 82  I have told her to make dietary modifications for her prediabetes.  A1c of 5.8  Goal A1c less than 130/90.    I will put in for a speech therapy referral    2. Dizziness  Her dizziness with presyncopal or syncopal episodes could be due to severe anemia as well as orthostatic hypotension which have been positive several times  Her anemia has improved and her last hemoglobin was 11.1.  She is taking iron supplements and pantoprazole  She  did cut back on her losartan  I will start her on midodrine 2.5 mg twice a day.  I have also told her to keep her self well-hydrated    3.  Vascular dementia  Her cognitive problems are most likely due to multiple strokes  I will increase her Aricept to 10 mg daily  I have again counseled her to carry out mental exercises such as reading, solving crossword puzzles etc.  She should continue B12 supplements        Return in about 3 months (around 1/20/2023).     I spent over 35 minutes with the patient face to face out of which over 50% (25 minutes) was spent in management    Siomara Hyde MD

## 2022-10-27 ENCOUNTER — TELEPHONE (OUTPATIENT)
Dept: NEUROLOGY | Facility: CLINIC | Age: 67
End: 2022-10-27

## 2022-10-27 NOTE — TELEPHONE ENCOUNTER
Provider:  DR. GREGORIO  Caller: [facility]ERSON  Relationship to Patient: PT'S SON  Pharmacy: [facility]  Phone Number: [phone]    Reason for Call:  PT'S SON WOULD LIKE TO TALK ABOUT THE CHANGE IN HIS MOM, LIKE SHE ASK WHERE [facility] IS AT AND OTHER INCIDENTS THAT SHE HAS DONE. THIS STARTED YESTERDAY MORNING AND ALSO SAYING THAT SHE 'S BEEN HAVING TROUBLE SLEEPING AT NIGHT. PLEASE HAVE DR. GREGORIO CALL HIM BACK.

## 2022-10-28 DIAGNOSIS — R41.0 DELIRIUM: Primary | ICD-10-CM

## 2022-10-28 NOTE — TELEPHONE ENCOUNTER
Relayed information to Fede. He understood and will have her PCP order the lab. She will have it done on Monday.   He had some additional concerns and questions he would like addressed.     He would like to know if his mother will always need to take Aricept. Will she become dependent on it? Is there an underlying reason to her taking this medication like alzheimer's or is it to just help her keep from progressing?     He wants to know if you feel that his mother will still get back to her normal functioning for example, paying bills on her own or other regular adult activities.     He states that he looked up vascular dementia on google and it claims that it is worse than alzheimer's and once diagnosed there is normally only a life span of 3-5 years. Is this accurate? He is concerned on if this is something he needs to prepare himself for.

## 2022-10-28 NOTE — TELEPHONE ENCOUNTER
Please tell the patient's son that I would first like to get her urine analysis to see if she has a UTI as that can cause confusion.  Further work-up or medications will depend on what the results show.  I have ordered a UA in the system.  He can either go to any [facility] facility or speak to her PCP about ordering the lab.  Sheshould do it today if possible

## 2022-10-31 NOTE — TELEPHONE ENCOUNTER
"Called Fede and left him a detailed vm relaying:    Per  \"Yes she will most likely have to take Aricept lifelong as cognitive impairment is a chronic condition.  Aricept helps to keep her stable.  There is no addiction or abuse potential with Aricept so he should not worry.  Also let him know that it is not true that vascular dementia has such a short life span.  Yes she can improve with physical and mental exercises.  I did put in a referral for speech therapy for her \"    Office # given if any questions.      "

## 2023-02-15 ENCOUNTER — OFFICE VISIT (OUTPATIENT)
Dept: NEUROLOGY | Facility: CLINIC | Age: 68
End: 2023-02-15
Payer: MEDICARE

## 2023-02-15 VITALS
SYSTOLIC BLOOD PRESSURE: 132 MMHG | DIASTOLIC BLOOD PRESSURE: 74 MMHG | WEIGHT: 187.4 LBS | BODY MASS INDEX: 30.12 KG/M2 | HEIGHT: 66 IN | OXYGEN SATURATION: 95 % | HEART RATE: 101 BPM

## 2023-02-15 DIAGNOSIS — R41.3 MEMORY LOSS: ICD-10-CM

## 2023-02-15 DIAGNOSIS — I69.30 SEQUELAE, POST-STROKE: Primary | ICD-10-CM

## 2023-02-15 DIAGNOSIS — R42 DIZZINESS: ICD-10-CM

## 2023-02-15 PROCEDURE — 99214 OFFICE O/P EST MOD 30 MIN: CPT | Performed by: PSYCHIATRY & NEUROLOGY

## 2023-02-15 RX ORDER — ASPIRIN 325 MG
325 TABLET ORAL DAILY
COMMUNITY

## 2023-02-15 RX ORDER — DIAZEPAM 5 MG/1
5 TABLET ORAL DAILY
COMMUNITY
End: 2023-02-15

## 2023-02-15 NOTE — PROGRESS NOTES
Subjective:    CC: Batsheva Barahona is seen today  for sequelae       Current visit- patient denies having any strokelike symptoms since her last visit.  She continues to take aspirin 325 mg daily and simvastatin 20 mg daily.  She did however have a syncopal episode after she took a shower this past Sunday.  On Monday she had another episode  where she almost fell but her son helped her up.  She had a CT scan of the head that did not show any acute intracranial abnormalities.  Her son did not give her the midodrine that was prescribed since he was afraid her blood pressure will shoot up.  Overall her spells of lightheadedness have improved since cutting back on her losartan but she is afraid to go in the shower as most of her syncopal episodes have been while taking a bath.  Her memory has also been more stable and she has not had any.'s of confusion recently.  As per son her short-term memory has improved and she is able to do some of her ADLs like dressing herself up and tying her shoelaces.  Did increase her Aricept to 10 mg daily and is tolerating it well.  Also continues to take Pristiq for her mood.  Denies any depressive symptoms.  Has been having trouble sleeping at night but she does take frequent naps during the daytime.  Of note- I reviewed her most recent labs and the CT head report    Last visit-as per patient's son she has been less confused since starting Aricept 5 mg daily.  She has also stopped seeing things.  Is still having urinary incontinence more so last week when they were traveling and had gone out of town.  During that 1 week she had several accidents.  She also has occasional lightheadedness/postural dizziness but denies passing out.  Did cut back on her losartan.  Her hemoglobin has been stable.  Also denies any strokelike symptoms.  Continues to take aspirin 81 mg along with simvastatin 40 mg daily.  She does have occasional dull headaches bilaterally.    Last visit-she had had another  episode of nearly passing out when she was in the shower earlier this month.  She was taken to [facility] where her initial hemoglobin was 6.3.  She was transfused 2 units of blood.  Her last hemoglobin on 8/15 was 11.1.  Her Plavix was also stopped and she was just continued on aspirin along with simvastatin 40 mg daily.  Her lipid panel was as follows-, , LDL 82, HDL 46, ferritin was on the lower side of normal, T4, TSH was normal, vitamin D was 37, B12 was 388 and urine analysis was negative.  She also had a CT head and MRI brain that showed chronic infarcts but no acute abnormalities.  As per son patient continues to have short-term memory problems with periods of confusion where she refers to her son in the third person while talking to him or thinks she has a second PET.  Is more confused when she is stressed out or when she wakes up after a nap.  Is also reluctant to take a bath and her son has to help her with that.  Since being back home she is yet to resume speech therapy/PT.  She sleeps fairly well despite stopping trazodone.  Does get up a few times at night to go to the bathroom as she is scared she may have an accident.  Could not tolerate pull-ups.  Of note- I reviewed all of her notes including blood work from [facility].  I also reviewed her previous Holter monitor that showed occasional PACs and PVCs but no atrial fibrillation.    Last visit-after her last visit in March patient sustained a fall in the bathtub in April when she passed out for a few minutes.  She was also more confused than usual.  She had her outpatient MRI brain a few days later that showed an acute infarct in the right centrum semiovale as well as chronic infarcts in the right middle cerebral peduncle and possibly in the left thalamus.  After that she got admitted to the hospital on 4/22 where she had a CT angiogram of the head and neck that showed left vert origin  stenosis (not very convincing) but no other acute intracranial or extracranial stenosis.  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  Echocardiogram showed a normal EF with normal left atrial size and no PFO.  Patient was started on Plavix in addition to aspirin and continued on simvastatin 40 mg.  Her lipid panel was as follows-, , LDL 77, HDL 40.  A1c was 5.8 and TSH is back down to 1.15.  Patient went to [facility] where she received PT/OT/speech therapy for almost 3 weeks.  She recently turned in her Holter monitor the results of which are still pending.  She is still having cognitive difficulties.  [facility] has also given her an outpatient PT/speech therapy referral.  Patient denies having any passing out spells.  Her blood pressure is now well controlled.  She has also been taking Pristiq 100 mg for her mood.  She has also been complaining of severe left knee pain for over 1 month.  Is able to walk long distances because of that.  Of note-I personally reviewed her MRI brain with the patient and her son.    Initial [id number] female accompanied by her son with a history of postsurgical hypothyroidism, hyperlipidemia, hypertension, aortic valve replacement, depression presents with memory problems, dizziness.  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  After intermediate she started to have worsening depression because of not having to do much .  Then about 6 to 8 months ago she stopped taking all of her medications as she did not think anything was helping including her thyroid, cholesterol, antidepressant and blood pressure meds.  Since then she has been extremely forgetful especially with her short-term memory.  She also gets agitated/irritable and sleeps a lot.  As per son she also has word finding difficulties and gets confused easily for example while ordering food from me drive-through patient takes a lot of time in  deciding what she will eat.  She can still carry out her ADLs including stopped paying the bills now (son has taken over).  She has also stopped driving.  She had recently including a TSH level that was 47.8!!  B12 was 302, lipid panel was as follows-, , .  She is also complaining of numbness in her fingertips as well as lightheadedness/dizziness on walking.  In May 2019 she felt dizzy and then passed out in the bathroom.  She was taken to [facility] at the time and told that she had a vasovagal episode.  Denies any recent episodes with loss of consciousness.    The following portions of the patient's history were reviewed today and updated as of 03/04/2022  : allergies, current medications, past family history, past medical history, past social history, past surgical history and problem list  These document will be scanned to patient's chart.      Current Outpatient Medications:   •  aspirin 325 MG tablet, Take 325 mg by mouth Daily., Disp: , Rfl:   •  cyanocobalamin ([employer] Vitamin B-12) 1000 MCG tablet, Take 1 tablet by mouth Daily., Disp: 100 tablet, Rfl: 2  •  desvenlafaxine (PRISTIQ) 100 MG 24 hr tablet, Take 1 tablet by mouth Daily., Disp: 30 tablet, Rfl: 6  •  donepezil (Aricept) 10 MG tablet, Take 1 tablet by mouth Every Night., Disp: 30 tablet, Rfl: 11  •  levothyroxine (SYNTHROID, LEVOTHROID) 100 MCG tablet, Take 100 mcg by mouth Daily., Disp: , Rfl:   •  losartan (COZAAR) 50 MG tablet, Take 1 tablet by mouth Daily. 25 mg daily, Disp: , Rfl:   •  pantoprazole (PROTONIX) 40 MG EC tablet, Take 40 mg by mouth Daily., Disp: , Rfl:   •  simvastatin (Zocor) 20 MG tablet, Take 1 tablet by mouth Every Evening., Disp: 90 tablet, Rfl: 3  •  clopidogrel (PLAVIX) 75 MG tablet, Take 1 tablet by mouth Daily., Disp: 30 tablet, Rfl:   •  midodrine (PROAMATINE) 2.5 MG tablet, Take 1 tablet by mouth 2 (Two) Times a Day Before Meals., Disp: 60 tablet, Rfl: 3   Past Medical History:   Diagnosis  "Date   • Anxiety    • Bowel trouble    • CAD (coronary artery disease)    • CTS (carpal tunnel syndrome)    • Depression    • Difficulty walking    • Dizziness    • History of heart valve replacement    • HL (hearing loss)    • Hypertension    • Memory loss    • Migraine    • Numbness and tingling    • Thyroid disease    • Weakness       Past Surgical History:   Procedure Laterality Date   • AORTIC VALVE REPAIR/REPLACEMENT     • BONE SPUR ARM      [place]   •  SECTION        Family History   Problem Relation Age of Onset   • Aneurysm Mother    • Cancer Mother    • Stroke Mother    • Migraines Sister    • Migraines Brother    • Cancer Maternal Grandmother    • Dementia Maternal Grandmother       Social History     Socioeconomic History   • Marital status:    Tobacco Use   • Smoking status: Former     Types: Cigarettes   • Smokeless tobacco: Never   • Tobacco comments:     quit 2013   Vaping Use   • Vaping Use: Never used   Substance and Sexual Activity   • Alcohol use: Never   • Drug use: Never   • Sexual activity: Defer     Review of Systems   Neurological: Positive for dizziness, memory problem and confusion.   All other systems reviewed and are negative.      Objective:    /74   Pulse 101   Ht 168.9 cm (66.5\")   Wt 85 kg (187 lb 6.4 oz)   SpO2 95%   BMI 29.80 kg/m²     Neurology Exam:    General apperance: Mild masklike facies.  Orthostatics previously were positive.  Blood pressure on lying down was 140/72, 94, on sitting up 128/62, 83 and on standing up 110/60, 104    Mental status: Alert, awake and oriented to  place and person.  Could tell me the month, her date of birth and where she lives.  Had difficulty with the year and her son's date of birth    Recent and Remote memory: Impaired.  MMSE previously of 15/30 with a delayed recall of 0/3.  At the previous visit-17/20 with a delayed recall of 0/3    Attention span and Concentration: Normal.     Language and Speech: Intact- No " dysarthria.    Fluency, Naming , Repitition and Comprehension:  Intact    Cranial Nerves:   CN II: Visual fields are full. Intact. Fundi - Normal, No papillederma, Pupils - JUDE  CN III, IV and VI: Extraocular movements are intact. Normal saccades.   CN V: Facial sensation is intact.   CN VII: Muscles of facial expression reveal no asymmetry. Intact.   CN VIII: Hearing is intact. Whispered voice intact.   CN IX and X: Palate elevates symmetrically. Intact  CN XI: Shoulder shrug is intact.   CN XII: Tongue is midline without evidence of atrophy or fasciculation.     Ophthalmoscopic exam of optic disc-normal    Motor:  Right UE muscle strength 5/5. Normal tone.     Left UE muscle strength 5/5. Normal tone.      Right LE muscle strength5/5. Normal tone.     Left LE muscle strength 5/5. Normal tone.      Sensory: Normal light touch, vibration and pinprick sensation bilaterally.    DTRs: 2+ bilaterally in upper and lower extremities.    Babinski: Negative bilaterally.    Co-ordination: Normal finger-to-nose, heel to shin B/L.    Rhomberg: Negative.    Gait: Mild shuffling noted.  Uses a cane    Cardiovascular: Regular rate and rhythm without murmur, gallop or rub.    Assessment and Plan:  1.  Sequelae post stroke  Patient had an acute right centrum semiovale stroke and also has chronic infarcts in the right cerebral peduncle and possibly left thalamus.  CT angiogram shows possible left vert stenosis at its origin but no intracranial stenosis  Holter monitor did not show any atrial fibrillation.  Recent MRI brain did not show any new strokes  She should continue aspirin 325 mg daily and simvastatin 20 mg for goal LDL of less than 100.  Her LDL was 82  I had told her to make dietary modifications for her prediabetes.  A1c of 5.8  Goal A1c less than 130/90.    I will give her a balance and speech therapy referral    2.  Syncope  Most likely due to orthostatic hypotension.  Orthostatics have been positive several times  She  has cut back on her losartan  I have once again told her to take midodrine 2.5 mg at least once a day 30 minutes before she goes in for a shower  She should continue keeping her self well-hydrated    3.  Vascular dementia  Her cognitive problems are most likely due to multiple strokes  She should continue Aricept to 10 mg daily  I have again counseled her to carry out mental exercises such as reading, solving crossword puzzles etc.  She can take melatonin 5 mg nightly for her sleep        Return in about 4 months (around 6/15/2023).     I spent over 28 minutes with the patient face to face out of which over 50% (25 minutes) was spent in management    Siomara Hyde MD

## 2023-06-08 ENCOUNTER — OFFICE VISIT (OUTPATIENT)
Dept: NEUROLOGY | Facility: CLINIC | Age: 68
End: 2023-06-08
Payer: MEDICARE

## 2023-06-08 VITALS
OXYGEN SATURATION: 94 % | BODY MASS INDEX: 29.16 KG/M2 | SYSTOLIC BLOOD PRESSURE: 132 MMHG | HEIGHT: 67 IN | HEART RATE: 68 BPM | WEIGHT: 185.8 LBS | DIASTOLIC BLOOD PRESSURE: 80 MMHG

## 2023-06-08 DIAGNOSIS — I69.30 SEQUELAE, POST-STROKE: Primary | ICD-10-CM

## 2023-06-08 DIAGNOSIS — R42 DIZZINESS: ICD-10-CM

## 2023-06-08 DIAGNOSIS — F01.50 VASCULAR DEMENTIA WITHOUT BEHAVIORAL DISTURBANCE: ICD-10-CM

## 2023-06-08 PROCEDURE — 1159F MED LIST DOCD IN RCRD: CPT | Performed by: PSYCHIATRY & NEUROLOGY

## 2023-06-08 PROCEDURE — 3075F SYST BP GE 130 - 139MM HG: CPT | Performed by: PSYCHIATRY & NEUROLOGY

## 2023-06-08 PROCEDURE — 3079F DIAST BP 80-89 MM HG: CPT | Performed by: PSYCHIATRY & NEUROLOGY

## 2023-06-08 PROCEDURE — 1160F RVW MEDS BY RX/DR IN RCRD: CPT | Performed by: PSYCHIATRY & NEUROLOGY

## 2023-06-08 PROCEDURE — 99214 OFFICE O/P EST MOD 30 MIN: CPT | Performed by: PSYCHIATRY & NEUROLOGY

## 2023-06-08 RX ORDER — MEMANTINE HYDROCHLORIDE 5 MG/1
5 TABLET ORAL 2 TIMES DAILY
Qty: 60 TABLET | Refills: 6 | Status: SHIPPED | OUTPATIENT
Start: 2023-06-08 | End: 2024-06-07

## 2023-06-08 RX ORDER — DONEPEZIL HYDROCHLORIDE 10 MG/1
10 TABLET, FILM COATED ORAL NIGHTLY
Qty: 30 TABLET | Refills: 11 | Status: SHIPPED | OUTPATIENT
Start: 2023-06-08 | End: 2024-06-07

## 2023-06-08 NOTE — PROGRESS NOTES
Subjective:    CC: Batsheva Barahona is seen today  for sequelae       Current visit-patient and her son feel that she has been doing better in terms of her memory/balance since she started getting speech/balance therapy that she goes for about once a week.  She continues to take donepezil 10 mg daily and Pristiq 100 mg daily for her mood.  She still has some periods of agitation as per son where she refuses to take a bath.  Last month she did not take her back for 9 days!!  Sometimes feels that she is a burden on her son.  When he is working she passes her time by playing with her dog, crocheting and reading books.  Sleeps well at night on melatonin.  Denies any visual/auditory hallucinations.  She also denies any strokelike symptoms.  Continues to be on aspirin 325 mg daily and simvastatin 20 mg daily.  Has not had any falls or syncopal episodes recently.  Does take midodrine 2.5 mg in the morning.    Last visit-patient denies having any strokelike symptoms since her last visit.  She continues to take aspirin 325 mg daily and simvastatin 20 mg daily.  She did however have a syncopal episode after she took a shower this past Sunday.  On Monday she had another episode  where she almost fell but her son helped her up.  She had a CT scan of the head that did not show any acute intracranial abnormalities.  Her son did not give her the midodrine that was prescribed since he was afraid her blood pressure will shoot up.  Overall her spells of lightheadedness have improved since cutting back on her losartan but she is afraid to go in the shower as most of her syncopal episodes have been while taking a bath.  Her memory has also been more stable and she has not had any.'s of confusion recently.  As per son her short-term memory has improved and she is able to do some of her ADLs like dressing herself up and tying her shoelaces.  Did increase her Aricept to 10 mg daily and is tolerating it well.  Also continues to take  Pristiq for her mood.  Denies any depressive symptoms.  Has been having trouble sleeping at night but she does take frequent naps during the daytime.  Of note- I reviewed her most recent labs and the CT head report    Last visit-as per patient's son she has been less confused since starting Aricept 5 mg daily.  She has also stopped seeing things.  Is still having urinary incontinence more so last week when they were traveling and had gone out of town.  During that 1 week she had several accidents.  She also has occasional lightheadedness/postural dizziness but denies passing out.  Did cut back on her losartan.  Her hemoglobin has been stable.  Also denies any strokelike symptoms.  Continues to take aspirin 81 mg along with simvastatin 40 mg daily.  She does have occasional dull headaches bilaterally.    Last visit-she had had another episode of nearly passing out when she was in the shower earlier this month.  She was taken to [facility] where her initial hemoglobin was 6.3.  She was transfused 2 units of blood.  Her last hemoglobin on 8/15 was 11.1.  Her Plavix was also stopped and she was just continued on aspirin along with simvastatin 40 mg daily.  Her lipid panel was as follows-, , LDL 82, HDL 46, ferritin was on the lower side of normal, T4, TSH was normal, vitamin D was 37, B12 was 388 and urine analysis was negative.  She also had a CT head and MRI brain that showed chronic infarcts but no acute abnormalities.  As per son patient continues to have short-term memory problems with periods of confusion where she refers to her son in the third person while talking to him or thinks she has a second PET.  Is more confused when she is stressed out or when she wakes up after a nap.  Is also reluctant to take a bath and her son has to help her with that.  Since being back home she is yet to resume speech therapy/PT.  She sleeps fairly well despite stopping trazodone.  Does get up a  few times at night to go to the bathroom as she is scared she may have an accident.  Could not tolerate pull-ups.  Of note- I reviewed all of her notes including blood work from [facility].  I also reviewed her previous Holter monitor that showed occasional PACs and PVCs but no atrial fibrillation.    Last visit-after her last visit in March patient sustained a fall in the bathtub in April when she passed out for a few minutes.  She was also more confused than usual.  She had her outpatient MRI brain a few days later that showed an acute infarct in the right centrum semiovale as well as chronic infarcts in the right middle cerebral peduncle and possibly in the left thalamus.  After that she got admitted to the hospital on 4/22 where she had a CT angiogram of the head and neck that showed left vert origin stenosis (not very convincing) but no other acute intracranial or extracranial stenosis.  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  Echocardiogram showed a normal EF with normal left atrial size and no PFO.  Patient was started on Plavix in addition to aspirin and continued on simvastatin 40 mg.  Her lipid panel was as follows-, , LDL 77, HDL 40.  A1c was 5.8 and TSH is back down to 1.15.  Patient went to [facility] where she received PT/OT/speech therapy for almost 3 weeks.  She recently turned in her Holter monitor the results of which are still pending.  She is still having cognitive difficulties.  [facility] has also given her an outpatient PT/speech therapy referral.  Patient denies having any passing out spells.  Her blood pressure is now well controlled.  She has also been taking Pristiq 100 mg for her mood.  She has also been complaining of severe left knee pain for over 1 month.  Is able to walk long distances because of that.  Of note-I personally reviewed her MRI brain with the patient and her son.    Initial [age]btbo-15-osrb-old female accompanied by her son with a  history of postsurgical hypothyroidism, hyperlipidemia, hypertension, aortic valve replacement, depression presents with memory problems, dizziness.  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  After detention she started to have worsening depression because of not having to do much .  Then about 6 to 8 months ago she stopped taking all of her medications as she did not think anything was helping including her thyroid, cholesterol, antidepressant and blood pressure meds.  Since then she has been extremely forgetful especially with her short-term memory.  She also gets agitated/irritable and sleeps a lot.  As per son she also has word finding difficulties and gets confused easily for example while ordering food from me drive-through patient takes a lot of time in deciding what she will eat.  She can still carry out her ADLs including stopped paying the bills now (son has taken over).  She has also stopped driving.  She had recently including a TSH level that was 47.8!!  B12 was 302, lipid panel was as follows-, , .  She is also complaining of numbness in her fingertips as well as lightheadedness/dizziness on walking.  In May 2019 she felt dizzy and then passed out in the bathroom.  She was taken to [facility] at the time and told that she had a vasovagal episode.  Denies any recent episodes with loss of consciousness.    The following portions of the patient's history were reviewed today and updated as of 03/04/2022  : allergies, current medications, past family history, past medical history, past social history, past surgical history and problem list  These document will be scanned to patient's chart.      Current Outpatient Medications:     aspirin 325 MG tablet, Take 1 tablet by mouth Daily., Disp: , Rfl:     cyanocobalamin ([employer] Vitamin B-12) 1000 MCG tablet, Take 1 tablet by mouth Daily., Disp: 100 tablet, Rfl: 2     desvenlafaxine (PRISTIQ) 100 MG 24 hr tablet, Take 1 tablet by mouth Daily., Disp: 30 tablet, Rfl: 6    donepezil (Aricept) 10 MG tablet, Take 1 tablet by mouth Every Night., Disp: 30 tablet, Rfl: 11    levothyroxine (SYNTHROID, LEVOTHROID) 100 MCG tablet, Take 1 tablet by mouth Daily., Disp: , Rfl:     losartan (COZAAR) 50 MG tablet, Take 1 tablet by mouth Daily. 25 mg daily, Disp: , Rfl:     midodrine (PROAMATINE) 2.5 MG tablet, Take 1 tablet by mouth 2 (Two) Times a Day Before Meals., Disp: 60 tablet, Rfl: 3    pantoprazole (PROTONIX) 40 MG EC tablet, Take 1 tablet by mouth Daily., Disp: , Rfl:     simvastatin (Zocor) 20 MG tablet, Take 1 tablet by mouth Every Evening., Disp: 90 tablet, Rfl: 3   Past Medical History:   Diagnosis Date    Anxiety     Bowel trouble     CAD (coronary artery disease)     CTS (carpal tunnel syndrome)     Depression     Difficulty walking     Dizziness     History of heart valve replacement     HL (hearing loss)     Hypertension     Memory loss     Migraine     Numbness and tingling     Thyroid disease     Weakness       Past Surgical History:   Procedure Laterality Date    AORTIC VALVE REPAIR/REPLACEMENT      BONE SPUR ARM      Elbow     SECTION        Family History   Problem Relation Age of Onset    Aneurysm Mother     Cancer Mother     Stroke Mother     Migraines Sister     Migraines Brother     Cancer Maternal Grandmother     Dementia Maternal Grandmother       Social History     Socioeconomic History    Marital status:    Tobacco Use    Smoking status: Former     Types: Cigarettes     Passive exposure: Past    Smokeless tobacco: Never    Tobacco comments:     quit 2013   Vaping Use    Vaping Use: Never used   Substance and Sexual Activity    Alcohol use: Never    Drug use: Never    Sexual activity: Defer     Review of Systems   Neurological:  Positive for dizziness, memory problem and confusion.   All other systems reviewed and are negative.    Objective:    BP  "132/80   Pulse 68   Ht 168.9 cm (66.5\")   Wt 84.3 kg (185 lb 12.8 oz)   SpO2 94%   BMI 29.54 kg/m²     Neurology Exam:    General apperance: Mild masklike facies.  Orthostatics previously were positive.  Blood pressure on lying down was 140/72, 94, on sitting up 128/62, 83 and on standing up 110/60, 104    Mental status: Alert, awake and oriented to  place and person.  Could tell me the month, her date of birth and where she lives.  Had difficulty with the year and her son's date of birth    Recent and Remote memory: Impaired.  Delayed recall of 0/3 today.    MMSE previously of 15/30 with a delayed recall of 0/3.  At the previous visit-17/20 with a delayed recall of 0/3    Attention span and Concentration: Normal.     Language and Speech: Intact- No dysarthria although paucity of speech noted    Fluency, Naming , Repitition and Comprehension:  Intact    Cranial Nerves:   CN II: Visual fields are full. Intact. Fundi - Normal, No papillederma, Pupils - JUDE  CN III, IV and VI: Extraocular movements are intact. Normal saccades.   CN V: Facial sensation is intact.   CN VII: Muscles of facial expression reveal no asymmetry. Intact.   CN VIII: Hearing is intact. Whispered voice intact.   CN IX and X: Palate elevates symmetrically. Intact  CN XI: Shoulder shrug is intact.   CN XII: Tongue is midline without evidence of atrophy or fasciculation.     Ophthalmoscopic exam of optic disc-normal    Motor:  Right UE muscle strength 5/5. Normal tone.     Left UE muscle strength 5/5. Normal tone.      Right LE muscle strength5/5. Normal tone.     Left LE muscle strength 5/5. Normal tone.      Sensory: Normal light touch, vibration and pinprick sensation bilaterally.    DTRs: 2+ bilaterally in upper and lower extremities.    Babinski: Negative bilaterally.    Co-ordination: Normal finger-to-nose, heel to shin B/L.    Rhomberg: Negative.    Gait: Slow and cautious but no shuffling noted.  Using a cane    Cardiovascular: Regular " rate and rhythm without murmur, gallop or rub.    Assessment and Plan:  1.  Sequelae post stroke  Patient had an acute right centrum semiovale stroke and also has chronic infarcts in the right cerebral peduncle and possibly left thalamus.  CT angiogram shows possible left vert stenosis at its origin but no intracranial stenosis  Holter monitor did not show any atrial fibrillation.  Recent MRI brain did not show any new strokes  She should continue aspirin 325 mg daily and simvastatin 20 mg for goal LDL of less than 100.  Her LDL was 82  I had told her to make dietary modifications for her prediabetes.  A1c of 5.8  Goal A1c less than 130/90.    Continue balance therapy    2.  Syncope  Most likely due to orthostatic hypotension.  Orthostatics have been positive several times  She can continue midodrine 2.5 mg 1-2 times a day  She should continue keeping her self well-hydrated    3.  Vascular dementia  Her cognitive problems are most likely due to multiple strokes and underlying mood problems  She should continue Aricept to 10 mg daily.  I will also start her on Namenda 5 mg twice daily  She should continue speech therapy.  She currently takes Pristiq for her mood but son could talk to her PCP about starting her on BuSpar as she still has some.'s of anxiety/agitation  Takes melatonin 5 mg nightly  Son will let me know if she needs an Occupational Therapy referral for carrying out her ADLs such as taking a shower      Return in about 6 months (around 12/8/2023).     I spent over 31 minutes with the patient face to face out of which over 50% (25 minutes) was spent in management    Siomara Hyed MD

## 2023-09-20 RX ORDER — SIMVASTATIN 20 MG
20 TABLET ORAL EVERY EVENING
Qty: 90 TABLET | Refills: 0 | Status: SHIPPED | OUTPATIENT
Start: 2023-09-20 | End: 2024-09-19

## 2023-09-20 NOTE — TELEPHONE ENCOUNTER
Caller: RANDI Rivera call back number: [phone]  Requested Prescriptions:   Requested Prescriptions     Pending Prescriptions Disp Refills    simvastatin (Zocor) 20 MG tablet 90 tablet 3     Sig: Take 1 tablet by mouth Every Evening.        Pharmacy where request should be sent:  [facility]    Last office visit with prescribing clinician: 6/8/2023   Last telemedicine visit with prescribing clinician: Visit date not found   Next office visit with prescribing clinician: 12/4/2023     Additional details provided by patient:     Does the patient have less than a 3 day supply:  [x] Yes  [] No    Would you like a call back once the refill request has been completed: [] Yes [x] No    If the office needs to give you a call back, can they leave a voicemail: [] Yes [x] No    Hilary Stone Rep   09/20/23 11:02 EDT

## 2023-09-20 NOTE — TELEPHONE ENCOUNTER
Rx Refill Note  Requested Prescriptions     Pending Prescriptions Disp Refills    simvastatin (Zocor) 20 MG tablet 90 tablet 3     Sig: Take 1 tablet by mouth Every Evening.      Last filled:09/15/2022  Last office visit with prescribing clinician: 6/8/2023      Next office visit with prescribing clinician: 12/4/2023     Sabrina Bowie MA  09/20/23, 15:50 EDT    I sent the Rx to the pharmacy.

## 2023-11-14 NOTE — TELEPHONE ENCOUNTER
Rx Refill Note  Requested Prescriptions     Pending Prescriptions Disp Refills    midodrine (PROAMATINE) 2.5 MG tablet 60 tablet 3     Sig: Take 1 tablet by mouth 2 (Two) Times a Day Before Meals.      Last filled:10/20/2022  Last office visit with prescribing clinician: 6/8/2023      Next office visit with prescribing clinician: 12/4/2023     Sabrina Bowie MA  11/14/23, 14:43 EST

## 2023-11-16 RX ORDER — MIDODRINE HYDROCHLORIDE 2.5 MG/1
2.5 TABLET ORAL
Qty: 60 TABLET | Refills: 3 | Status: SHIPPED | OUTPATIENT
Start: 2023-11-16

## 2023-12-04 ENCOUNTER — TELEPHONE (OUTPATIENT)
Dept: NEUROLOGY | Facility: CLINIC | Age: 68
End: 2023-12-04

## 2023-12-04 ENCOUNTER — TELEMEDICINE (OUTPATIENT)
Dept: NEUROLOGY | Facility: CLINIC | Age: 68
End: 2023-12-04
Payer: MEDICARE

## 2023-12-04 DIAGNOSIS — F01.50 VASCULAR DEMENTIA WITHOUT BEHAVIORAL DISTURBANCE: ICD-10-CM

## 2023-12-04 DIAGNOSIS — R42 DIZZINESS: ICD-10-CM

## 2023-12-04 DIAGNOSIS — I69.30 SEQUELAE, POST-STROKE: Primary | ICD-10-CM

## 2023-12-04 PROCEDURE — 99214 OFFICE O/P EST MOD 30 MIN: CPT | Performed by: PSYCHIATRY & NEUROLOGY

## 2023-12-04 PROCEDURE — 1159F MED LIST DOCD IN RCRD: CPT | Performed by: PSYCHIATRY & NEUROLOGY

## 2023-12-04 PROCEDURE — 1160F RVW MEDS BY RX/DR IN RCRD: CPT | Performed by: PSYCHIATRY & NEUROLOGY

## 2023-12-04 RX ORDER — MEMANTINE HYDROCHLORIDE 10 MG/1
10 TABLET ORAL 2 TIMES DAILY
Qty: 60 TABLET | Refills: 11 | Status: SHIPPED | OUTPATIENT
Start: 2023-12-04 | End: 2024-12-03

## 2023-12-04 RX ORDER — MIDODRINE HYDROCHLORIDE 2.5 MG/1
2.5 TABLET ORAL
Qty: 60 TABLET | Refills: 3 | Status: SHIPPED | OUTPATIENT
Start: 2023-12-04

## 2023-12-04 RX ORDER — DONEPEZIL HYDROCHLORIDE 10 MG/1
10 TABLET, FILM COATED ORAL NIGHTLY
Qty: 30 TABLET | Refills: 11 | Status: SHIPPED | OUTPATIENT
Start: 2023-12-04 | End: 2024-12-03

## 2023-12-04 NOTE — PROGRESS NOTES
Subjective:    CC: Batsheva Barahona is seen today via video visit for memory problems    Current visit-patient feels that she is stable in terms of her memory but as per her son she is still having some episodes of confusion as well as verbal perseveration where she often repeats the same thing over and over again.  During a conversation she may also remember only certain parts.  In addition she is having episodes of agitation where she gets easily irritable.  Her son increased her dose of melatonin to 15 mg nightly as she is not sleeping well.  She has started to take Namenda 5 mg twice daily which she is tolerating well in addition to donepezil 10 mg daily and Pristiq.  Denies any new strokelike symptoms.  Does not need any support to walk at home but continues to walk with a cane outside the house.  Denies any syncopal episodes.  Is taking midodrine 5 mg daily.    Last visit-patient and her son feel that she has been doing better in terms of her memory/balance since she started getting speech/balance therapy that she goes for about once a week.  She continues to take donepezil 10 mg daily and Pristiq 100 mg daily for her mood.  She still has some periods of agitation as per son where she refuses to take a bath.  Last month she did not take her back for 9 days!!  Sometimes feels that she is a burden on her son.  When he is working she passes her time by playing with her dog, crocheting and reading books.  Sleeps well at night on melatonin.  Denies any visual/auditory hallucinations.  She also denies any strokelike symptoms.  Continues to be on aspirin 325 mg daily and simvastatin 20 mg daily.  Has not had any falls or syncopal episodes recently.  Does take midodrine 2.5 mg in the morning.    Last visit-patient denies having any strokelike symptoms since her last visit.  She continues to take aspirin 325 mg daily and simvastatin 20 mg daily.  She did however have a syncopal episode after she took a shower this  past Sunday.  On Monday she had another episode  where she almost fell but her son helped her up.  She had a CT scan of the head that did not show any acute intracranial abnormalities.  Her son did not give her the midodrine that was prescribed since he was afraid her blood pressure will shoot up.  Overall her spells of lightheadedness have improved since cutting back on her losartan but she is afraid to go in the shower as most of her syncopal episodes have been while taking a bath.  Her memory has also been more stable and she has not had any.'s of confusion recently.  As per son her short-term memory has improved and she is able to do some of her ADLs like dressing herself up and tying her shoelaces.  Did increase her Aricept to 10 mg daily and is tolerating it well.  Also continues to take Pristiq for her mood.  Denies any depressive symptoms.  Has been having trouble sleeping at night but she does take frequent naps during the daytime.  Of note- I reviewed her most recent labs and the CT head report    Last visit-as per patient's son she has been less confused since starting Aricept 5 mg daily.  She has also stopped seeing things.  Is still having urinary incontinence more so last week when they were traveling and had gone out of town.  During that 1 week she had several accidents.  She also has occasional lightheadedness/postural dizziness but denies passing out.  Did cut back on her losartan.  Her hemoglobin has been stable.  Also denies any strokelike symptoms.  Continues to take aspirin 81 mg along with simvastatin 40 mg daily.  She does have occasional dull headaches bilaterally.    Last visit-she had had another episode of nearly passing out when she was in the shower earlier this month.  She was taken to [facility] where her initial hemoglobin was 6.3.  She was transfused 2 units of blood.  Her last hemoglobin on 8/15 was 11.1.  Her Plavix was also stopped and she was just  continued on aspirin along with simvastatin 40 mg daily.  Her lipid panel was as follows-, , LDL 82, HDL 46, ferritin was on the lower side of normal, T4, TSH was normal, vitamin D was 37, B12 was 388 and urine analysis was negative.  She also had a CT head and MRI brain that showed chronic infarcts but no acute abnormalities.  As per son patient continues to have short-term memory problems with periods of confusion where she refers to her son in the third person while talking to him or thinks she has a second PET.  Is more confused when she is stressed out or when she wakes up after a nap.  Is also reluctant to take a bath and her son has to help her with that.  Since being back home she is yet to resume speech therapy/PT.  She sleeps fairly well despite stopping trazodone.  Does get up a few times at night to go to the bathroom as she is scared she may have an accident.  Could not tolerate pull-ups.  Of note- I reviewed all of her notes including blood work from [facility].  I also reviewed her previous Holter monitor that showed occasional PACs and PVCs but no atrial fibrillation.    Last visit-after her last visit in March patient sustained a fall in the bathtub in April when she passed out for a few minutes.  She was also more confused than usual.  She had her outpatient MRI brain a few days later that showed an acute infarct in the right centrum semiovale as well as chronic infarcts in the right middle cerebral peduncle and possibly in the left thalamus.  After that she got admitted to the hospital on 4/22 where she had a CT angiogram of the head and neck that showed left vert origin stenosis (not very convincing) but no other acute intracranial or extracranial stenosis.  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  Echocardiogram showed a normal EF with normal left atrial size and no PFO.  Patient was started on Plavix in addition to aspirin and continued on simvastatin 40  mg.  Her lipid panel was as follows-, , LDL 77, HDL 40.  A1c was 5.8 and TSH is back down to 1.15.  Patient went to [facility] where she received PT/OT/speech therapy for almost 3 weeks.  She recently turned in her Holter monitor the results of which are still pending.  She is still having cognitive difficulties.  [facility] has also given her an outpatient PT/speech therapy referral.  Patient denies having any passing out spells.  Her blood pressure is now well controlled.  She has also been taking Pristiq 100 mg for her mood.  She has also been complaining of severe left knee pain for over 1 month.  Is able to walk long distances because of that.  Of note-I personally reviewed her MRI brain with the patient and her son.    Initial [id number] female accompanied by her son with a history of postsurgical hypothyroidism, hyperlipidemia, hypertension, aortic valve replacement, depression presents with memory problems, dizziness.  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  After skilled nursing she started to have worsening depression because of not having to do much .  Then about 6 to 8 months ago she stopped taking all of her medications as she did not think anything was helping including her thyroid, cholesterol, antidepressant and blood pressure meds.  Since then she has been extremely forgetful especially with her short-term memory.  She also gets agitated/irritable and sleeps a lot.  As per son she also has word finding difficulties and gets confused easily for example while ordering food from me drive-through patient takes a lot of time in deciding what she will eat.  She can still carry out her ADLs including stopped paying the bills now (son has taken over).  She has also stopped driving.  She had recently including a TSH level that was 47.8!!  B12 was 302, lipid panel was as follows-, , .  She is also complaining  of numbness in her fingertips as well as lightheadedness/dizziness on walking.  In May 2019 she felt dizzy and then passed out in the bathroom.  She was taken to [facility] at the time and told that she had a vasovagal episode.  Denies any recent episodes with loss of consciousness.    The following portions of the patient's history were reviewed today and updated as of 03/04/2022  : allergies, current medications, past family history, past medical history, past social history, past surgical history and problem list  These document will be scanned to patient's chart.      Current Outpatient Medications:     donepezil (Aricept) 10 MG tablet, Take 1 tablet by mouth Every Night., Disp: 30 tablet, Rfl: 11    midodrine (PROAMATINE) 2.5 MG tablet, Take 1 tablet by mouth 2 (Two) Times a Day Before Meals., Disp: 60 tablet, Rfl: 3    aspirin 325 MG tablet, Take 1 tablet by mouth Daily., Disp: , Rfl:     desvenlafaxine (PRISTIQ) 100 MG 24 hr tablet, Take 1 tablet by mouth Daily., Disp: 30 tablet, Rfl: 6    levothyroxine (SYNTHROID, LEVOTHROID) 100 MCG tablet, Take 1 tablet by mouth Daily., Disp: , Rfl:     losartan (COZAAR) 50 MG tablet, Take 1 tablet by mouth Daily. 25 mg daily, Disp: , Rfl:     memantine (NAMENDA) 10 MG tablet, Take 1 tablet by mouth 2 (Two) Times a Day., Disp: 60 tablet, Rfl: 11    pantoprazole (PROTONIX) 40 MG EC tablet, Take 1 tablet by mouth Daily., Disp: , Rfl:     simvastatin (Zocor) 20 MG tablet, Take 1 tablet by mouth Every Evening., Disp: 90 tablet, Rfl: 0   Past Medical History:   Diagnosis Date    Anxiety     Bowel trouble     CAD (coronary artery disease)     CTS (carpal tunnel syndrome)     Depression     Difficulty walking     Dizziness     History of heart valve replacement     HL (hearing loss)     Hypertension     Memory loss     Migraine     Numbness and tingling     Thyroid disease     Weakness       Past Surgical History:   Procedure Laterality Date    AORTIC VALVE  REPAIR/REPLACEMENT      BONE SPUR ARM      Elbow     SECTION        Family History   Problem Relation Age of Onset    Aneurysm Mother     Cancer Mother     Stroke Mother     Migraines Sister     Migraines Brother     Cancer Maternal Grandmother     Dementia Maternal Grandmother       Social History     Socioeconomic History    Marital status:    Tobacco Use    Smoking status: Former     Types: Cigarettes     Passive exposure: Past    Smokeless tobacco: Never    Tobacco comments:     quit 2013   Vaping Use    Vaping Use: Never used   Substance and Sexual Activity    Alcohol use: Never    Drug use: Never    Sexual activity: Defer     Review of Systems   Neurological:  Positive for dizziness, memory problem and confusion.   All other systems reviewed and are negative.      Objective:    There were no vitals taken for this visit.    Neurology Exam:    General apperance: Mild masklike facies.  Orthostatics previously were positive.  Blood pressure on lying down was 140/72, 94, on sitting up 128/62, 83 and on standing up 110/60, 104    Mental status: Alert, awake and oriented to  place and person.  Could tell me her date of birth and partly her address but not the month or year    Recent and Remote memory: Impaired.  Delayed recall of 0/3    MMSE previously of 15/30 with a delayed recall of 0/3.  At the previous visit- with a delayed recall of 0/3    Attention span and Concentration: Normal.     Language and Speech: Intact- No dysarthria although paucity of speech noted    Fluency, Naming , Repitition and Comprehension:  Intact    Cranial Nerves:   CN II: Visual fields are full. Intact. Fundi - Normal, No papillederma, Pupils - JUDE  CN III, IV and VI: Extraocular movements are intact. Normal saccades.   CN V: Facial sensation is intact.   CN VII: Muscles of facial expression reveal no asymmetry. Intact.   CN VIII: Hearing is intact. Whispered voice intact.   CN IX and X: Palate elevates  symmetrically. Intact  CN XI: Shoulder shrug is intact.   CN XII: Tongue is midline without evidence of atrophy or fasciculation.     Ophthalmoscopic exam of optic disc-deferred    Motor:  Intact    Gait: Slow and cautious but no shuffling noted.  Using a cane        Assessment and Plan:  1.  Sequelae post stroke  Patient had an acute right centrum semiovale stroke and also has chronic infarcts in the right cerebral peduncle and possibly left thalamus.  CT angiogram shows possible left vert stenosis at its origin but no intracranial stenosis  Holter monitor did not show any atrial fibrillation.  Recent MRI brain did not show any new strokes  She should continue aspirin 325 mg daily and simvastatin 20 mg for goal LDL of less than 100.  Her LDL was 82  I had told her to make dietary modifications for her prediabetes.  A1c of 5.8  Goal A1c less than 130/90.    I will give her another referral for physical speech and occupational therapy     2.  Syncope  Most likely due to orthostatic hypotension.  Orthostatics have been positive several times  She can continue midodrine 5 mg daily  She should continue keeping her self well-hydrated    3.  Vascular dementia  Her cognitive problems are most likely due to multiple strokes and underlying mood problems  She should continue Aricept to 10 mg daily.  I will use her Namenda to 10 mg twice daily  She will hopefully benefit from speech therapy  She currently takes Pristiq for her mood but I have once again told her son to talk to her PCP about starting her on BuSpar -takes melatonin for sleep        Return in about 6 months (around 6/4/2024).     I spent over 25 minutes with the patient face to face out of which over 50% (20minutes) was spent in management    Siomara Hyde MD

## 2023-12-04 NOTE — TELEPHONE ENCOUNTER
PATIENT HAD AN APPOINTMENT TODAY BUT SON COULD NOT MAKE HER COME IN. CHANGED TO A VIDEO VISIT AND SENT THE SON THE [employer].

## 2023-12-07 ENCOUNTER — TELEPHONE (OUTPATIENT)
Dept: NEUROLOGY | Facility: CLINIC | Age: 68
End: 2023-12-07
Payer: MEDICARE

## 2023-12-07 NOTE — TELEPHONE ENCOUNTER
LEFT VOICE MAIL TO SCHEDULE PATIENTS NEXT 6 MONTH FOLLOW UP. DR GREGORIO REQUESTED IN THE CLINIC THIS TIME.

## 2024-05-08 ENCOUNTER — OFFICE VISIT (OUTPATIENT)
Dept: NEUROLOGY | Facility: CLINIC | Age: 69
End: 2024-05-08
Payer: MEDICARE

## 2024-05-08 VITALS
OXYGEN SATURATION: 95 % | HEART RATE: 94 BPM | BODY MASS INDEX: 33.02 KG/M2 | SYSTOLIC BLOOD PRESSURE: 172 MMHG | DIASTOLIC BLOOD PRESSURE: 72 MMHG | WEIGHT: 210.4 LBS | HEIGHT: 67 IN

## 2024-05-08 DIAGNOSIS — I95.1 ORTHOSTATIC HYPOTENSION: ICD-10-CM

## 2024-05-08 DIAGNOSIS — R26.89 BALANCE DISORDER: ICD-10-CM

## 2024-05-08 DIAGNOSIS — F01.50 VASCULAR DEMENTIA WITHOUT BEHAVIORAL DISTURBANCE: ICD-10-CM

## 2024-05-08 DIAGNOSIS — I69.30 SEQUELAE, POST-STROKE: Primary | ICD-10-CM

## 2024-05-08 PROCEDURE — 1159F MED LIST DOCD IN RCRD: CPT | Performed by: PSYCHIATRY & NEUROLOGY

## 2024-05-08 PROCEDURE — 3077F SYST BP >= 140 MM HG: CPT | Performed by: PSYCHIATRY & NEUROLOGY

## 2024-05-08 PROCEDURE — 3078F DIAST BP <80 MM HG: CPT | Performed by: PSYCHIATRY & NEUROLOGY

## 2024-05-08 PROCEDURE — 1160F RVW MEDS BY RX/DR IN RCRD: CPT | Performed by: PSYCHIATRY & NEUROLOGY

## 2024-05-08 PROCEDURE — 99214 OFFICE O/P EST MOD 30 MIN: CPT | Performed by: PSYCHIATRY & NEUROLOGY

## 2024-05-08 RX ORDER — MEMANTINE HYDROCHLORIDE 10 MG/1
10 TABLET ORAL 2 TIMES DAILY
Qty: 60 TABLET | Refills: 11 | Status: SHIPPED | OUTPATIENT
Start: 2024-05-08 | End: 2025-05-08

## 2024-05-08 RX ORDER — BUSPIRONE HYDROCHLORIDE 5 MG/1
5 TABLET ORAL 2 TIMES DAILY
COMMUNITY

## 2024-05-08 RX ORDER — LEVOCETIRIZINE DIHYDROCHLORIDE 5 MG/1
5 TABLET, FILM COATED ORAL EVERY EVENING
COMMUNITY

## 2024-05-08 RX ORDER — DONEPEZIL HYDROCHLORIDE 10 MG/1
10 TABLET, FILM COATED ORAL NIGHTLY
Qty: 30 TABLET | Refills: 11 | Status: SHIPPED | OUTPATIENT
Start: 2024-05-08 | End: 2025-05-08

## 2024-05-08 RX ORDER — CHOLECALCIFEROL (VITAMIN D3) 125 MCG
CAPSULE ORAL
COMMUNITY

## 2024-05-08 RX ORDER — QUETIAPINE FUMARATE 25 MG/1
12.5 TABLET, FILM COATED ORAL NIGHTLY
Qty: 15 TABLET | Refills: 5 | Status: SHIPPED | OUTPATIENT
Start: 2024-05-08

## 2024-05-20 ENCOUNTER — TELEPHONE (OUTPATIENT)
Dept: NEUROLOGY | Facility: CLINIC | Age: 69
End: 2024-05-20

## 2024-05-20 DIAGNOSIS — F01.50 VASCULAR DEMENTIA WITHOUT BEHAVIORAL DISTURBANCE: Primary | ICD-10-CM

## 2024-06-13 ENCOUNTER — TELEPHONE (OUTPATIENT)
Dept: NEUROLOGY | Facility: CLINIC | Age: 69
End: 2024-06-13
Payer: MEDICARE

## 2024-06-13 NOTE — TELEPHONE ENCOUNTER
Caller: GUEVARA    Relationship: W/ HOME HEALTH    Best call back number: [phone]     Who are you requesting to speak with (clinical staff, provider,  specific staff member): DARLINE    What was the call regarding: GUEVARA W/ W/ [facility] TELEPHONED TO ADVISE SHE IS NEEDING ORDERS FOR PLAN CARE SENT ON 6/10 & ADDEMDUM SENT ON 5/20, SIGNED & FAXED BACK TO THE OFFICE @ [phone]

## 2024-10-08 ENCOUNTER — LAB (OUTPATIENT)
Dept: LAB | Facility: HOSPITAL | Age: 69
End: 2024-10-08
Payer: MEDICARE

## 2024-10-08 ENCOUNTER — OFFICE VISIT (OUTPATIENT)
Dept: NEUROLOGY | Facility: CLINIC | Age: 69
End: 2024-10-08
Payer: MEDICARE

## 2024-10-08 VITALS
WEIGHT: 208 LBS | BODY MASS INDEX: 32.65 KG/M2 | HEIGHT: 67 IN | HEART RATE: 73 BPM | OXYGEN SATURATION: 95 % | DIASTOLIC BLOOD PRESSURE: 90 MMHG | SYSTOLIC BLOOD PRESSURE: 150 MMHG

## 2024-10-08 DIAGNOSIS — I95.1 ORTHOSTATIC HYPOTENSION: ICD-10-CM

## 2024-10-08 DIAGNOSIS — R73.03 PREDIABETES: ICD-10-CM

## 2024-10-08 DIAGNOSIS — I69.30 SEQUELAE, POST-STROKE: ICD-10-CM

## 2024-10-08 DIAGNOSIS — F01.50 VASCULAR DEMENTIA WITHOUT BEHAVIORAL DISTURBANCE: ICD-10-CM

## 2024-10-08 DIAGNOSIS — N39.0 URINARY TRACT INFECTION WITHOUT HEMATURIA, SITE UNSPECIFIED: ICD-10-CM

## 2024-10-08 DIAGNOSIS — R26.89 BALANCE DISORDER: ICD-10-CM

## 2024-10-08 DIAGNOSIS — I69.30 SEQUELAE, POST-STROKE: Primary | ICD-10-CM

## 2024-10-08 PROCEDURE — 99214 OFFICE O/P EST MOD 30 MIN: CPT | Performed by: PSYCHIATRY & NEUROLOGY

## 2024-10-08 PROCEDURE — 1160F RVW MEDS BY RX/DR IN RCRD: CPT | Performed by: PSYCHIATRY & NEUROLOGY

## 2024-10-08 PROCEDURE — 36415 COLL VENOUS BLD VENIPUNCTURE: CPT

## 2024-10-08 PROCEDURE — 1159F MED LIST DOCD IN RCRD: CPT | Performed by: PSYCHIATRY & NEUROLOGY

## 2024-10-08 PROCEDURE — 3077F SYST BP >= 140 MM HG: CPT | Performed by: PSYCHIATRY & NEUROLOGY

## 2024-10-08 PROCEDURE — 80061 LIPID PANEL: CPT

## 2024-10-08 PROCEDURE — 3080F DIAST BP >= 90 MM HG: CPT | Performed by: PSYCHIATRY & NEUROLOGY

## 2024-10-08 PROCEDURE — 83036 HEMOGLOBIN GLYCOSYLATED A1C: CPT

## 2024-10-08 RX ORDER — ACETAMINOPHEN 500 MG
1000 TABLET ORAL 2 TIMES DAILY
COMMUNITY

## 2024-10-08 RX ORDER — MIDODRINE HYDROCHLORIDE 2.5 MG/1
2.5 TABLET ORAL DAILY
Qty: 90 TABLET | Refills: 3 | Status: SHIPPED | OUTPATIENT
Start: 2024-10-08

## 2024-10-08 RX ORDER — QUETIAPINE FUMARATE 25 MG/1
25 TABLET, FILM COATED ORAL NIGHTLY
Qty: 30 TABLET | Refills: 5 | Status: SHIPPED | OUTPATIENT
Start: 2024-10-08

## 2024-10-08 NOTE — PROGRESS NOTES
Subjective:    CC: Batsheva Barahona is seen today for memory problems    Current visit-as per son she is doing about the same in terms of her memory.  She was supposed to get home speech therapy but no one ever came to her house.  She still has occasional periods of confusion for example today she asked about having 2 dogs whereas they only have 1 dog.  She also has occasional urinary and bowel incontinence specially early in the morning when she wakes up.  She continues to take both donepezil and Namenda and also started taking Seroquel 12.5 mg nightly along with melatonin 10 mg nightly but is still having some sleep disturbances however her vivid dreams have improved.  She denies having any episodes of syncope or severe dizziness.  Has been taking midodrine 2.5 mg daily.  Drinks very little water during the day.  Also denies having any strokelike symptoms.  Continues to take aspirin 325 mg daily in addition to simvastatin 20 mg daily.    Last visit-patient feels she has been doing better in terms of her memory after she increased her dose of Namenda to 10 mg twice daily that she takes in addition to donepezil.  However as per son she still lacks insight for example she does not want to take a bath.  A few months ago she went several weeks without taking 1 as she felt her house was too cold.  She also had an altercation with her son as he had put the dog outside in the balcony as he was throwing up which she kept getting him inside the house.  Also continues to have balance issues but denies having any falls.  Continues to take aspirin and simvastatin 20 mg daily.  Her mood is adequately controlled with Pristiq and her PCP also recently started her on low doses of BuSpar.  She still has a lot of difficulty sleeping at night.  Has been taking melatonin 15 to 20 mg nightly.  She also wakes up at times having vivid dreams thinking they are real.  Her postural lightheadedness is better with midodrine 2.5 mg daily.   Son does not give her the second dose as her blood pressure is high occasionally like today.  At her last visit I had given her a PT referral however she only went once as she could not be persuaded to get out of the house.    Last visit-patient feels that she is stable in terms of her memory but as per her son she is still having some episodes of confusion as well as verbal perseveration where she often repeats the same thing over and over again.  During a conversation she may also remember only certain parts.  In addition she is having episodes of agitation where she gets easily irritable.  Her son increased her dose of melatonin to 15 mg nightly as she is not sleeping well.  She has started to take Namenda 5 mg twice daily which she is tolerating well in addition to donepezil 10 mg daily and Pristiq.  Denies any new strokelike symptoms.  Does not need any support to walk at home but continues to walk with a cane outside the house.  Denies any syncopal episodes.  Is taking midodrine 5 mg daily.    Last visit-patient and her son feel that she has been doing better in terms of her memory/balance since she started getting speech/balance therapy that she goes for about once a week.  She continues to take donepezil 10 mg daily and Pristiq 100 mg daily for her mood.  She still has some periods of agitation as per son where she refuses to take a bath.  Last month she did not take her back for 9 days!!  Sometimes feels that she is a burden on her son.  When he is working she passes her time by playing with her dog, crocheting and reading books.  Sleeps well at night on melatonin.  Denies any visual/auditory hallucinations.  She also denies any strokelike symptoms.  Continues to be on aspirin 325 mg daily and simvastatin 20 mg daily.  Has not had any falls or syncopal episodes recently.  Does take midodrine 2.5 mg in the morning.    Last visit-patient denies having any strokelike symptoms since her last visit.  She  continues to take aspirin 325 mg daily and simvastatin 20 mg daily.  She did however have a syncopal episode after she took a shower this past Sunday.  On Monday she had another episode  where she almost fell but her son helped her up.  She had a CT scan of the head that did not show any acute intracranial abnormalities.  Her son did not give her the midodrine that was prescribed since he was afraid her blood pressure will shoot up.  Overall her spells of lightheadedness have improved since cutting back on her losartan but she is afraid to go in the shower as most of her syncopal episodes have been while taking a bath.  Her memory has also been more stable and she has not had any.'s of confusion recently.  As per son her short-term memory has improved and she is able to do some of her ADLs like dressing herself up and tying her shoelaces.  Did increase her Aricept to 10 mg daily and is tolerating it well.  Also continues to take Pristiq for her mood.  Denies any depressive symptoms.  Has been having trouble sleeping at night but she does take frequent naps during the daytime.  Of note- I reviewed her most recent labs and the CT head report    Last visit-as per patient's son she has been less confused since starting Aricept 5 mg daily.  She has also stopped seeing things.  Is still having urinary incontinence more so last week when they were traveling and had gone out of town.  During that 1 week she had several accidents.  She also has occasional lightheadedness/postural dizziness but denies passing out.  Did cut back on her losartan.  Her hemoglobin has been stable.  Also denies any strokelike symptoms.  Continues to take aspirin 81 mg along with simvastatin 40 mg daily.  She does have occasional dull headaches bilaterally.    Last visit-she had had another episode of nearly passing out when she was in the shower earlier this month.  She was taken to [facility] where her initial hemoglobin  was 6.3.  She was transfused 2 units of blood.  Her last hemoglobin on 8/15 was 11.1.  Her Plavix was also stopped and she was just continued on aspirin along with simvastatin 40 mg daily.  Her lipid panel was as follows-, , LDL 82, HDL 46, ferritin was on the lower side of normal, T4, TSH was normal, vitamin D was 37, B12 was 388 and urine analysis was negative.  She also had a CT head and MRI brain that showed chronic infarcts but no acute abnormalities.  As per son patient continues to have short-term memory problems with periods of confusion where she refers to her son in the third person while talking to him or thinks she has a second PET.  Is more confused when she is stressed out or when she wakes up after a nap.  Is also reluctant to take a bath and her son has to help her with that.  Since being back home she is yet to resume speech therapy/PT.  She sleeps fairly well despite stopping trazodone.  Does get up a few times at night to go to the bathroom as she is scared she may have an accident.  Could not tolerate pull-ups.  Of note- I reviewed all of her notes including blood work from [facility].  I also reviewed her previous Holter monitor that showed occasional PACs and PVCs but no atrial fibrillation.    Last visit-after her last visit in March patient sustained a fall in the bathtub in April when she passed out for a few minutes.  She was also more confused than usual.  She had her outpatient MRI brain a few days later that showed an acute infarct in the right centrum semiovale as well as chronic infarcts in the right middle cerebral peduncle and possibly in the left thalamus.  After that she got admitted to the hospital on 4/22 where she had a CT angiogram of the head and neck that showed left vert origin stenosis (not very convincing) but no other acute intracranial or extracranial stenosis.  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  Echocardiogram showed a normal  EF with normal left atrial size and no PFO.  Patient was started on Plavix in addition to aspirin and continued on simvastatin 40 mg.  Her lipid panel was as follows-, , LDL 77, HDL 40.  A1c was 5.8 and TSH is back down to 1.15.  Patient went to [facility] where she received PT/OT/speech therapy for almost 3 weeks.  She recently turned in her Holter monitor the results of which are still pending.  She is still having cognitive difficulties.  [facility] has also given her an outpatient PT/speech therapy referral.  Patient denies having any passing out spells.  Her blood pressure is now well controlled.  She has also been taking Pristiq 100 mg for her mood.  She has also been complaining of severe left knee pain for over 1 month.  Is able to walk long distances because of that.  Of note-I personally reviewed her MRI brain with the patient and her son.    Initial eimcz-30-dgzu-old female accompanied by her son with a history of postsurgical hypothyroidism, hyperlipidemia, hypertension, aortic valve replacement, depression presents with memory problems, dizziness.  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  After nursing home she started to have worsening depression because of not having to do much .  Then about 6 to 8 months ago she stopped taking all of her medications as she did not think anything was helping including her thyroid, cholesterol, antidepressant and blood pressure meds.  Since then she has been extremely forgetful especially with her short-term memory.  She also gets agitated/irritable and sleeps a lot.  As per son she also has word finding difficulties and gets confused easily for example while ordering food from me drive-through patient takes a lot of time in deciding what she will eat.  She can still carry out her ADLs including stopped paying the bills now (son has taken over).  She has also stopped driving.  She had recently  including a TSH level that was 47.8!!  B12 was 302, lipid panel was as follows-, , .  She is also complaining of numbness in her fingertips as well as lightheadedness/dizziness on walking.  In May 2019 she felt dizzy and then passed out in the bathroom.  She was taken to [facility] at the time and told that she had a vasovagal episode.  Denies any recent episodes with loss of consciousness.    The following portions of the patient's history were reviewed today and updated as of 03/04/2022  : allergies, current medications, past family history, past medical history, past social history, past surgical history and problem list  These document will be scanned to patient's chart.      Current Outpatient Medications:     acetaminophen (TYLENOL) 500 MG tablet, Take 2 tablets by mouth 2 (Two) Times a Day., Disp: , Rfl:     aspirin 325 MG tablet, Take 1 tablet by mouth Daily., Disp: , Rfl:     busPIRone (BUSPAR) 5 MG tablet, Take 1 tablet by mouth 2 (Two) Times a Day., Disp: , Rfl:     desvenlafaxine (PRISTIQ) 100 MG 24 hr tablet, Take 1 tablet by mouth Daily., Disp: 30 tablet, Rfl: 6    donepezil (Aricept) 10 MG tablet, Take 1 tablet by mouth Every Night., Disp: 30 tablet, Rfl: 11    Ferrous Sulfate (IRON PO), Take  by mouth., Disp: , Rfl:     levocetirizine (XYZAL) 5 MG tablet, Take 1 tablet by mouth Every Evening., Disp: , Rfl:     levothyroxine (SYNTHROID, LEVOTHROID) 100 MCG tablet, Take 1 tablet by mouth Daily., Disp: , Rfl:     losartan (COZAAR) 50 MG tablet, Take 1 tablet by mouth Daily. 25 mg daily, Disp: , Rfl:     melatonin 5 MG tablet tablet, Take  by mouth. 5 mg tablets 3-4 times a night, Disp: , Rfl:     memantine (NAMENDA) 10 MG tablet, Take 1 tablet by mouth 2 (Two) Times a Day., Disp: 60 tablet, Rfl: 11    midodrine (PROAMATINE) 2.5 MG tablet, Take 1 tablet by mouth Daily., Disp: 90 tablet, Rfl: 3    pantoprazole (PROTONIX) 40 MG EC tablet, Take 1 tablet by mouth Daily.,  "Disp: , Rfl:     QUEtiapine (SEROquel) 25 MG tablet, Take 1 tablet by mouth Every Night., Disp: 30 tablet, Rfl: 5    simvastatin (Zocor) 20 MG tablet, Take 1 tablet by mouth Every Evening., Disp: 90 tablet, Rfl: 0   Past Medical History:   Diagnosis Date    Anxiety     Bowel trouble     CAD (coronary artery disease)     CTS (carpal tunnel syndrome)     Depression     Difficulty walking     Dizziness     History of heart valve replacement     HL (hearing loss)     Hypertension     Memory loss     Migraine     Numbness and tingling     Stroke     Thyroid disease     Weakness       Past Surgical History:   Procedure Laterality Date    AORTIC VALVE REPAIR/REPLACEMENT      BONE SPUR ARM      Elbow     SECTION        Family History   Problem Relation Age of Onset    Aneurysm Mother     Cancer Mother     Stroke Mother     Migraines Sister     Migraines Brother     Cancer Maternal Grandmother     Dementia Maternal Grandmother       Social History     Socioeconomic History    Marital status:    Tobacco Use    Smoking status: Former     Types: Cigarettes     Passive exposure: Past    Smokeless tobacco: Never    Tobacco comments:     quit 2013   Vaping Use    Vaping status: Never Used   Substance and Sexual Activity    Alcohol use: Not Currently    Drug use: Never    Sexual activity: Defer     Review of Systems   Neurological:  Positive for dizziness, memory problem and confusion.   All other systems reviewed and are negative.      Objective:    /90   Pulse 73   Ht 168.9 cm (66.5\")   Wt 94.3 kg (208 lb)   SpO2 95%   BMI 33.07 kg/m²     Neurology Exam:    General apperance: Flat affect.  Orthostatics previously were positive.  Blood pressure on lying down was 140/72, 94, on sitting up 128/62, 83 and on standing up 110/60, 104    Mental status: Alert, awake and oriented to  place and person.  Could tell me the month, her address partly and her date of birth but not the year    Recent and Remote memory: " Impaired.  Delayed recall of 0/3    MMSE previously of 15/30 with a delayed recall of 0/3.  At the previous visit-17/20 with a delayed recall of 0/3    Attention span and Concentration: Normal.     Language and Speech: Intact- No dysarthria although paucity of speech noted    Fluency, Naming , Repitition and Comprehension:  Intact    Cranial Nerves:   CN II: Visual fields are full. Intact. Fundi - Normal, No papillederma, Pupils - JUDE  CN III, IV and VI: Extraocular movements are intact. Normal saccades.   CN V: Facial sensation is intact.   CN VII: Muscles of facial expression reveal no asymmetry. Intact.   CN VIII: Hearing is intact. Whispered voice intact.   CN IX and X: Palate elevates symmetrically. Intact  CN XI: Shoulder shrug is intact.   CN XII: Tongue is midline without evidence of atrophy or fasciculation.     Ophthalmoscopic exam of optic disc-deferred    Motor:  Intact    Sensory intact    DTR-2+ bilaterally symmetrical    Gait: Slow and cautious with occasional shuffling noted.  Using a cane        Assessment and Plan:  1.  Sequelae post stroke  Patient had an acute right centrum semiovale stroke and also has chronic infarcts in the right cerebral peduncle and possibly left thalamus.  CT angiogram shows possible left vert stenosis at its origin but no intracranial stenosis  Holter monitor did not show any atrial fibrillation.  Recent MRI brain did not show any new strokes  She should continue aspirin 325 mg daily and simvastatin 20 mg for goal LDL of less than 100.  I will recheck a lipid panel and A1c level today  I had told her to make dietary modifications for her prediabetes.  A1c of 5.8  Goal blood pressure less than 130/90.      2.  Syncope  Most likely due to orthostatic hypotension.  Orthostatics have been positive several times  She can continue midodrine 5 mg daily  She should continue keeping her self well-hydrated    3.  Vascular dementia  Her cognitive problems are most likely due to  multiple strokes and underlying mood problems  She should continue Aricept to 10 mg daily and Namenda 10 mg twice daily  She currently takes Pristiq and BuSpar.   I will increase her Seroquel to 25 mg nightly.  She can gradually taper and stop melatonin.  She should urinate right before bedtime to prevent accidents and set an alarm to use her bedside commode at night.  Can also use depends.  I will give her a speech therapy referral    4.  Balance disorder  I will give her home PT referral    5.  UTI  She has had some episodes of confusion recently.  I will check a UA to rule out UTI        Return in about 6 months (around 4/8/2025).     I spent over 28 minutes with the patient face to face out of which over 50% (25 minutes) was spent in management    Siomara Hyde MD

## 2024-10-09 LAB
CHOLEST SERPL-MCNC: 183 MG/DL (ref 0–200)
HBA1C MFR BLD: 5.8 % (ref 4.8–5.6)
HDLC SERPL-MCNC: 44 MG/DL (ref 40–60)
LDLC SERPL CALC-MCNC: 96 MG/DL (ref 0–100)
LDLC/HDLC SERPL: 2 {RATIO}
TRIGL SERPL-MCNC: 255 MG/DL (ref 0–150)
VLDLC SERPL-MCNC: 43 MG/DL (ref 5–40)

## 2024-10-10 ENCOUNTER — TELEPHONE (OUTPATIENT)
Dept: NEUROLOGY | Facility: CLINIC | Age: 69
End: 2024-10-10

## 2024-10-10 NOTE — TELEPHONE ENCOUNTER
Provider: DR GREGORIO    Caller: CHASE    Relationship to Patient: SON    Pharmacy: [facility]    Phone Number: [phone]    Reason for Call: AT THEIR RECENT APPT, HER SON FORGOT TO TELL YOU ABOUT THE RECURRING HEADACHES THAT SHE HAS BEEN DEALING WITH. HE GIVES HER TYLENOL AND IT SEEMS TO HELP. ALSO, SHE IS SUPER IRRITABLE, WON'T LAY DOWN WON'T SLEEP, AND WANTS TO ARGUE.       ALSO, CHASE WOULD LIKE TO KNOW IF HER LAB RESULTS ARE IN YET.    When was the patient last seen: 10/8/24    When did it start: SINCE AUG    Where is it located: FRONT OF HER HEAD    Characteristics of symptom/severity: FREQUENCY IS 8 OUT OF 10    Timing- Is it constant or intermittent: CONSTANT    What makes it worse: NO    What makes it better: NO    What therapies/medications have you tried: TYLENOL

## 2024-10-14 RX ORDER — SIMVASTATIN 40 MG
40 TABLET ORAL EVERY EVENING
Qty: 30 TABLET | Refills: 11 | Status: SHIPPED | OUTPATIENT
Start: 2024-10-14 | End: 2025-10-14

## 2024-10-15 NOTE — TELEPHONE ENCOUNTER
Spoke with Oliver and went over 's recommendations and he stated understanding.  He also wanted to let us know that Batsheva did not get her urinalysis done when they went to lab. Batsheva wasn't able to give any urine but they are going to get it done with her PCP where they live.

## 2025-03-04 ENCOUNTER — PATIENT MESSAGE (OUTPATIENT)
Dept: NEUROLOGY | Facility: CLINIC | Age: 70
End: 2025-03-04
Payer: MEDICARE

## 2025-03-20 ENCOUNTER — TELEPHONE (OUTPATIENT)
Dept: NEUROLOGY | Facility: CLINIC | Age: 70
End: 2025-03-20
Payer: MEDICARE

## 2025-03-20 NOTE — TELEPHONE ENCOUNTER
Provider: [phone]    Caller: CHASE EMERSON    Relationship to Patient: Emergency Contact    Phone Number: [phone]    Reason for Call: PT HAD A HOS STAY IN JANUARY.   SHE IS CURRENTLY IN [facility] IN [place].  SHE HAS BEEN THERE SINCE LATE JANUARY.  SHE IS STARTING TO HAVE INCONTINENCE, URINARY AND BOWEL.  HER MEMORY IS GETTING WORSE.  PT HAS APPT ON 5/22/25.  SON IS CALLING TO UPDATE THE PROVIDER AND IS WANTING TO KNOW WHAT THE REASON COULD BE FOR THE  INCONTINENCE.      THEY HAVE A SPEECH THERAPIST THERE AND PT REALLY DOES NOT LIKE HER AND PT REFUSES TO SEE HER OR BE IN THE SAME ROOM AS HER.  SHE IS SUPPOSED TO BE  HAVING SPEECH THERAPY, BUT SHE REFUSES     IT PT IS AT HOME HE DOES NOT HAVE CELL SERVICE SO CALL [phone] IF HE DOES NOT ANSWER CELL    PLEASE REVIEW AND ADVISE     THANK YOU

## 2025-03-21 DIAGNOSIS — R41.0 EPISODE OF CONFUSION: Primary | ICD-10-CM

## 2025-03-21 NOTE — TELEPHONE ENCOUNTER
Contacted [facility] and faxed ([phone])lab order over for UA and they will fax results once they receive them.  Once MRI is authorized we are to contact them as well.    *I called son and let him know that orders have been issued and once we receive the results we will let them know.  He was in good understanding.    I have let our referral coordinator know call the nursing rehab center.

## 2025-03-21 NOTE — TELEPHONE ENCOUNTER
Spoke with Fede and he stated that he had asked for repeat Mri be done while she was at hospital and they would not do it.  She did have a urine analysis done and she did not have UTI at that time.  He would like to have another done as she had another accident last night and is acting different.  She is still in rehab center.

## 2025-05-21 ENCOUNTER — TELEPHONE (OUTPATIENT)
Dept: NEUROLOGY | Facility: CLINIC | Age: 70
End: 2025-05-21
Payer: MEDICARE

## 2025-05-21 NOTE — TELEPHONE ENCOUNTER
Notified Fede that appointment has been changed to a mychart video visit at 3pm.  He was appreciative and in good understanding.

## 2025-05-21 NOTE — TELEPHONE ENCOUNTER
HASMUKH MASCORRO:    Caller: CHASE EMERSON    Relationship to patient: Emergency Contact    Best call back number: [phone]    Chief complaint: PT IS IN THE NURSING HOME    Type of visit: FU    Requested date: 5-22-25     If rescheduling, when is the original appointment:   5-22-25     Additional notes:  PT'S SON IS ASKING IF THIS CAN BE CONVERTED TO MY CHART VIDEO VISIT.    I SPOKE WITH SUBHA AND SHE WILL ASK DR GREGORIO.    PLEASE CALL CHASE BACK TODAY TO LET HIM KNOW IF THIS CAN BE CONVERTED TO MY CHART.    IF NO ANSWER, PLEASE SEND MY CHART MESSAGE.

## 2025-05-22 ENCOUNTER — TELEPHONE (OUTPATIENT)
Dept: NEUROLOGY | Facility: CLINIC | Age: 70
End: 2025-05-22
Payer: MEDICARE

## 2025-05-22 ENCOUNTER — TELEPHONE (OUTPATIENT)
Dept: NEUROLOGY | Facility: CLINIC | Age: 70
End: 2025-05-22

## 2025-05-22 ENCOUNTER — TELEMEDICINE (OUTPATIENT)
Dept: NEUROLOGY | Facility: CLINIC | Age: 70
End: 2025-05-22
Payer: MEDICARE

## 2025-05-22 DIAGNOSIS — I95.1 ORTHOSTATIC HYPOTENSION: ICD-10-CM

## 2025-05-22 DIAGNOSIS — F01.50 VASCULAR DEMENTIA WITHOUT BEHAVIORAL DISTURBANCE: ICD-10-CM

## 2025-05-22 DIAGNOSIS — I69.30 SEQUELAE, POST-STROKE: Primary | ICD-10-CM

## 2025-05-22 RX ORDER — DOCUSATE SODIUM 100 MG/1
200 CAPSULE, LIQUID FILLED ORAL 2 TIMES DAILY
COMMUNITY

## 2025-05-22 RX ORDER — DESVENLAFAXINE 50 MG/1
50 TABLET, FILM COATED, EXTENDED RELEASE ORAL DAILY
COMMUNITY

## 2025-05-22 RX ORDER — QUETIAPINE FUMARATE 25 MG/1
12.5 TABLET, FILM COATED ORAL NIGHTLY
Start: 2025-05-22

## 2025-05-22 NOTE — TELEPHONE ENCOUNTER
CALLED PATIENT TO SCHEDULE IN PERSON 3 MONTH FOLLOW UP WITH DR GREGORIO - NO ANSWER - LEFT MESSAGE FOR RETURN CALL

## 2025-05-22 NOTE — TELEPHONE ENCOUNTER
Talked to Fede, nurse at [facility] at [phone]. He took verbal orders to start aspirin 325 mg daily, Simvastatin 20 mg daily, Donepezil 10 mg daily, and midodrine 5 mg BID as needed if BP < 100/60. Fede repeated the medicines and understood. Gave him my number if any questions.

## 2025-05-22 NOTE — PROGRESS NOTES
Subjective:    CC: Batsheva Barahona is seen today via video visit for memory problems  This visit was completed face to face via VIDEO by [employer] with verbal consent to treat obtained from patient and/or family.  Provider confirmed the patient's name and  at the start of visit. Patient and/or family and/or caregiver confirms that they are located in [place] during visit and Provider is located in [facility] in [place], [place] during visit.      Current visit-patient had generalized weakness when she was taken to the hospital and diagnosed with COVID-pneumonia in December.  After that she was extremely weak therefore she was transferred to a nursing and rehab facility facility called [facility] where she has been getting PT/OT.  Is gradually getting her strength back.  About 2 months ago she seemed more confused than usual and her son had called us therefore we obtained an MRI brain that showed her chronic changes but no acute abnormalities.  UA did show a UTI for which she was subsequently treated.  She continues to have short-term memory problems but they are more or less stable.  Has been getting donepezil only 5 mg in addition to Namenda 10 mg twice daily and Seroquel 12.5 mg nightly.  She still has occasional urinary accidents.  Denies any new strokelike symptoms.  Her aspirin and simvastatin have been stopped.  Her last lipid panel was as follows-, , , HDL 39.  A1c was 6.3.  She also denies having any passing out spells.  Has not been getting her midodrine.  Her losartan dose was reduced.    Last visit-as per son she is doing about the same in terms of her memory.  She was supposed to get home speech therapy but no one ever came to her house.  She still has occasional periods of confusion for example today she asked about having 2 dogs whereas they only have 1 dog.  She also has occasional urinary and bowel incontinence specially early in the morning when she wakes up.  She continues  to take both donepezil and Namenda and also started taking Seroquel 12.5 mg nightly along with melatonin 10 mg nightly but is still having some sleep disturbances however her vivid dreams have improved.  She denies having any episodes of syncope or severe dizziness.  Has been taking midodrine 2.5 mg daily.  Drinks very little water during the day.  Also denies having any strokelike symptoms.  Continues to take aspirin 325 mg daily in addition to simvastatin 20 mg daily.    Last visit-patient feels she has been doing better in terms of her memory after she increased her dose of Namenda to 10 mg twice daily that she takes in addition to donepezil.  However as per son she still lacks insight for example she does not want to take a bath.  A few months ago she went several weeks without taking 1 as she felt her house was too cold.  She also had an altercation with her son as he had put the dog outside in the balcony as he was throwing up which she kept getting him inside the house.  Also continues to have balance issues but denies having any falls.  Continues to take aspirin and simvastatin 20 mg daily.  Her mood is adequately controlled with Pristiq and her PCP also recently started her on low doses of BuSpar.  She still has a lot of difficulty sleeping at night.  Has been taking melatonin 15 to 20 mg nightly.  She also wakes up at times having vivid dreams thinking they are real.  Her postural lightheadedness is better with midodrine 2.5 mg daily.  Son does not give her the second dose as her blood pressure is high occasionally like today.  At her last visit I had given her a PT referral however she only went once as she could not be persuaded to get out of the house.    Last visit-patient feels that she is stable in terms of her memory but as per her son she is still having some episodes of confusion as well as verbal perseveration where she often repeats the same thing over and over again.  During a conversation she  may also remember only certain parts.  In addition she is having episodes of agitation where she gets easily irritable.  Her son increased her dose of melatonin to 15 mg nightly as she is not sleeping well.  She has started to take Namenda 5 mg twice daily which she is tolerating well in addition to donepezil 10 mg daily and Pristiq.  Denies any new strokelike symptoms.  Does not need any support to walk at home but continues to walk with a cane outside the house.  Denies any syncopal episodes.  Is taking midodrine 5 mg daily.    Last visit-patient and her son feel that she has been doing better in terms of her memory/balance since she started getting speech/balance therapy that she goes for about once a week.  She continues to take donepezil 10 mg daily and Pristiq 100 mg daily for her mood.  She still has some periods of agitation as per son where she refuses to take a bath.  Last month she did not take her back for 9 days!!  Sometimes feels that she is a burden on her son.  When he is working she passes her time by playing with her dog, crocheting and reading books.  Sleeps well at night on melatonin.  Denies any visual/auditory hallucinations.  She also denies any strokelike symptoms.  Continues to be on aspirin 325 mg daily and simvastatin 20 mg daily.  Has not had any falls or syncopal episodes recently.  Does take midodrine 2.5 mg in the morning.    Last visit-patient denies having any strokelike symptoms since her last visit.  She continues to take aspirin 325 mg daily and simvastatin 20 mg daily.  She did however have a syncopal episode after she took a shower this past Sunday.  On Monday she had another episode  where she almost fell but her son helped her up.  She had a CT scan of the head that did not show any acute intracranial abnormalities.  Her son did not give her the midodrine that was prescribed since he was afraid her blood pressure will shoot up.  Overall her spells of lightheadedness have  improved since cutting back on her losartan but she is afraid to go in the shower as most of her syncopal episodes have been while taking a bath.  Her memory has also been more stable and she has not had any.'s of confusion recently.  As per son her short-term memory has improved and she is able to do some of her ADLs like dressing herself up and tying her shoelaces.  Did increase her Aricept to 10 mg daily and is tolerating it well.  Also continues to take Pristiq for her mood.  Denies any depressive symptoms.  Has been having trouble sleeping at night but she does take frequent naps during the daytime.  Of note- I reviewed her most recent labs and the CT head report    Last visit-as per patient's son she has been less confused since starting Aricept 5 mg daily.  She has also stopped seeing things.  Is still having urinary incontinence more so last week when they were traveling and had gone out of town.  During that 1 week she had several accidents.  She also has occasional lightheadedness/postural dizziness but denies passing out.  Did cut back on her losartan.  Her hemoglobin has been stable.  Also denies any strokelike symptoms.  Continues to take aspirin 81 mg along with simvastatin 40 mg daily.  She does have occasional dull headaches bilaterally.    Last visit-she had had another episode of nearly passing out when she was in the shower earlier this month.  She was taken to [facility] where her initial hemoglobin was 6.3.  She was transfused 2 units of blood.  Her last hemoglobin on 8/15 was 11.1.  Her Plavix was also stopped and she was just continued on aspirin along with simvastatin 40 mg daily.  Her lipid panel was as follows-, , LDL 82, HDL 46, ferritin was on the lower side of normal, T4, TSH was normal, vitamin D was 37, B12 was 388 and urine analysis was negative.  She also had a CT head and MRI brain that showed chronic infarcts but no acute abnormalities.  As per  son patient continues to have short-term memory problems with periods of confusion where she refers to her son in the third person while talking to him or thinks she has a second PET.  Is more confused when she is stressed out or when she wakes up after a nap.  Is also reluctant to take a bath and her son has to help her with that.  Since being back home she is yet to resume speech therapy/PT.  She sleeps fairly well despite stopping trazodone.  Does get up a few times at night to go to the bathroom as she is scared she may have an accident.  Could not tolerate pull-ups.  Of note- I reviewed all of her notes including blood work from [facility] [place], operated by [facility].  I also reviewed her previous Holter monitor that showed occasional PACs and PVCs but no atrial fibrillation.    Last visit-after her last visit in March patient sustained a fall in the bathtub in April when she passed out for a few minutes.  She was also more confused than usual.  She had her outpatient MRI brain a few days later that showed an acute infarct in the right centrum semiovale as well as chronic infarcts in the right middle cerebral peduncle and possibly in the left thalamus.  After that she got admitted to the hospital on 4/22 where she had a CT angiogram of the head and neck that showed left vert origin stenosis (not very convincing) but no other acute intracranial or extracranial stenosis.  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  Echocardiogram showed a normal EF with normal left atrial size and no PFO.  Patient was started on Plavix in addition to aspirin and continued on simvastatin 40 mg.  Her lipid panel was as follows-, , LDL 77, HDL 40.  A1c was 5.8 and TSH is back down to 1.15.  Patient went to [facility] where she received PT/OT/speech therapy for almost 3 weeks.  She recently turned in her Holter monitor the results of which are still pending.  She is still having cognitive difficulties.  [facility] has  also given her an outpatient PT/speech therapy referral.  Patient denies having any passing out spells.  Her blood pressure is now well controlled.  She has also been taking Pristiq 100 mg for her mood.  She has also been complaining of severe left knee pain for over 1 month.  Is able to walk long distances because of that.  Of note-I personally reviewed her MRI brain with the patient and her son.    Initial [id number] female accompanied by her son with a history of postsurgical hypothyroidism, hyperlipidemia, hypertension, aortic valve replacement, depression presents with memory problems, dizziness.  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  After half-way she started to have worsening depression because of not having to do much .  Then about 6 to 8 months ago she stopped taking all of her medications as she did not think anything was helping including her thyroid, cholesterol, antidepressant and blood pressure meds.  Since then she has been extremely forgetful especially with her short-term memory.  She also gets agitated/irritable and sleeps a lot.  As per son she also has word finding difficulties and gets confused easily for example while ordering food from me drive-through patient takes a lot of time in deciding what she will eat.  She can still carry out her ADLs including stopped paying the bills now (son has taken over).  She has also stopped driving.  She had recently including a TSH level that was 47.8!!  B12 was 302, lipid panel was as follows-, , .  She is also complaining of numbness in her fingertips as well as lightheadedness/dizziness on walking.  In May 2019 she felt dizzy and then passed out in the bathroom.  She was taken to [facility] at the time and told that she had a vasovagal episode.  Denies any recent episodes with loss of consciousness.    The following portions of the patient's history  were reviewed today and updated as of 03/04/2022  : allergies, current medications, past family history, past medical history, past social history, past surgical history and problem list  These document will be scanned to patient's chart.      Current Outpatient Medications:     desvenlafaxine (PRISTIQ) 50 MG 24 hr tablet, Take 1 tablet by mouth Daily., Disp: , Rfl:     docusate sodium (COLACE) 100 MG capsule, Take 2 capsules by mouth 2 (Two) Times a Day., Disp: , Rfl:     QUEtiapine (SEROquel) 25 MG tablet, Take 0.5 tablets by mouth Every Night., Disp: , Rfl:     acetaminophen (TYLENOL) 500 MG tablet, Take 2 tablets by mouth 2 (Two) Times a Day., Disp: , Rfl:     aspirin 325 MG tablet, Take 1 tablet by mouth Daily., Disp: , Rfl:     busPIRone (BUSPAR) 5 MG tablet, Take 1 tablet by mouth 2 (Two) Times a Day., Disp: , Rfl:     donepezil (Aricept) 10 MG tablet, Take 1 tablet by mouth Every Night., Disp: 30 tablet, Rfl: 11    Ferrous Sulfate (IRON PO), Take  by mouth., Disp: , Rfl:     levocetirizine (XYZAL) 5 MG tablet, Take 1 tablet by mouth Every Evening., Disp: , Rfl:     levothyroxine (SYNTHROID, LEVOTHROID) 100 MCG tablet, Take 1 tablet by mouth Daily., Disp: , Rfl:     losartan (COZAAR) 50 MG tablet, Take 0.5 tablets by mouth Daily. 25 mg daily, Disp: , Rfl:     melatonin 5 MG tablet tablet, Take  by mouth. 5 mg tablets 3-4 times a night, Disp: , Rfl:     memantine (NAMENDA) 10 MG tablet, Take 1 tablet by mouth 2 (Two) Times a Day., Disp: 60 tablet, Rfl: 11    midodrine (PROAMATINE) 2.5 MG tablet, Take 1 tablet by mouth Daily., Disp: 90 tablet, Rfl: 3    pantoprazole (PROTONIX) 40 MG EC tablet, Take 1 tablet by mouth Daily., Disp: , Rfl:     simvastatin (Zocor) 40 MG tablet, Take 1 tablet by mouth Every Evening., Disp: 30 tablet, Rfl: 11   Past Medical History:   Diagnosis Date    Anxiety     Bowel trouble     CAD (coronary artery disease)     CTS (carpal tunnel syndrome)     Depression     Difficulty walking      Dizziness     History of heart valve replacement     HL (hearing loss)     Hypertension     Memory loss     Migraine     Numbness and tingling     Stroke     Thyroid disease     Weakness       Past Surgical History:   Procedure Laterality Date    AORTIC VALVE REPAIR/REPLACEMENT      BONE SPUR ARM      Elbow     SECTION        Family History   Problem Relation Age of Onset    Aneurysm Mother     Cancer Mother     Stroke Mother     Migraines Sister     Migraines Brother     Cancer Maternal Grandmother     Dementia Maternal Grandmother       Social History     Socioeconomic History    Marital status:    Tobacco Use    Smoking status: Former     Types: Cigarettes     Passive exposure: Past    Smokeless tobacco: Never    Tobacco comments:     quit 2013   Vaping Use    Vaping status: Never Used   Substance and Sexual Activity    Alcohol use: Not Currently    Drug use: Never    Sexual activity: Defer     Review of Systems   Neurological:  Positive for dizziness, memory problem and confusion.   All other systems reviewed and are negative.      Objective:    There were no vitals taken for this visit.    Neurology Exam:    General apperance: Flat affect.  Orthostatics previously were positive.  Blood pressure on lying down was 140/72, 94, on sitting up 128/62, 83 and on standing up 110/60, 104    Mental status: Alert, awake and oriented to  place and person.  Could not tell me the month or year but could tell me her date of birth and her address partly    Recent and Remote memory: Impaired.     MMSE previously of 15/30 with a delayed recall of 0/3.  At the previous visit-17/20 with a delayed recall of 0/3    Attention span and Concentration: Normal.     Language and Speech: Intact- No dysarthria although paucity of speech noted    Fluency, Naming , Repitition and Comprehension:  Intact    Cranial Nerves:   CN II: Visual fields are full. Intact. Fundi - Normal, No papillederma, Pupils - JUDE  CN III, IV and  VI: Extraocular movements are intact. Normal saccades.   CN V: Facial sensation is intact.   CN VII: Muscles of facial expression reveal no asymmetry. Intact.   CN VIII: Hearing is intact. Whispered voice intact.   CN IX and X: Palate elevates symmetrically. Intact  CN XI: Shoulder shrug is intact.   CN XII: Tongue is midline without evidence of atrophy or fasciculation.     Ophthalmoscopic exam of optic disc-deferred    Motor:  Intact    Sensory intact    DTR-2+ bilaterally symmetrical    Gait: Slow and cautious with occasional shuffling noted.  Using a cane        Assessment and Plan:  1.  Sequelae post stroke  Patient had an acute right centrum semiovale stroke and also has chronic infarcts in the right cerebral peduncle and possibly left thalamus.  CT angiogram shows possible left vert stenosis at its origin but no intracranial stenosis  Holter monitor did not show any atrial fibrillation.  Recent MRI brain did not show any new strokes  I will tell the nursing home to resume aspirin 325 mg daily and simvastatin 20 mg for goal LDL of less than 100.  Last LDL was 167  I had told her to make dietary modifications for her prediabetes.  A1c of 6.3  Goal blood pressure less than 130/90.    Should continue PT/OT    2.  Syncope  Most likely due to orthostatic hypotension.  Orthostatics have been positive several times  She can be given midodrine 5 mg twice daily as needed for blood pressure readings less than 100/60    3.  Vascular dementia  Her cognitive problems are most likely due to multiple strokes and underlying mood problems  She should increase her donepezil to 10 mg daily in addition to Namenda 10 mg twice daily  She currently takes Pristiq and BuSpar.   Can continue Seroquel 12.5 mg nightly for her mood/sleep in addition to melatonin 3 mg nightly          Return in about 3 months (around 8/22/2025).     I spent over 40 minutes with the patient face to face out of which over 50% (25 minutes) was spent in  management    Siomara Hyde MD

## 2025-05-22 NOTE — TELEPHONE ENCOUNTER
Nurse practitioner Consuelo Dubois called because she was confused and concerned. She had been told the son had face timed us, I corrected her that we had a my chart visit. She was concerned why certain medications were added and was worried that weren't being updated correctly. She said the son doesn't include her in care and will make appointments and get tests ordered without talking with them or letting them know concerns. I let her know that Dr. Hyde had said in the past she had orthostatic blood pressures and that is why we did Midrodrine, the aspirin was due to having had a stroke. She said she is on Lipitor at the facility. I did ask for a med list from them so we can see her medicines and then I said we could fax an office note for them. She said that would be helpful and she didn't know that Dr. Hyde has being seeing this patient for a while, I told her 3 years. I gave her our fax number phone number to call with questions.

## 2025-08-25 ENCOUNTER — TELEPHONE (OUTPATIENT)
Dept: NEUROLOGY | Facility: CLINIC | Age: 70
End: 2025-08-25
Payer: MEDICARE